# Patient Record
Sex: FEMALE | HISPANIC OR LATINO | Employment: FULL TIME
[De-identification: names, ages, dates, MRNs, and addresses within clinical notes are randomized per-mention and may not be internally consistent; named-entity substitution may affect disease eponyms.]

---

## 2017-12-31 ENCOUNTER — HEALTH MAINTENANCE LETTER (OUTPATIENT)
Age: 27
End: 2017-12-31

## 2019-02-21 ENCOUNTER — TELEPHONE (OUTPATIENT)
Dept: FAMILY MEDICINE | Facility: CLINIC | Age: 29
End: 2019-02-21

## 2020-03-10 ENCOUNTER — HEALTH MAINTENANCE LETTER (OUTPATIENT)
Age: 30
End: 2020-03-10

## 2020-04-06 ENCOUNTER — OFFICE VISIT - HEALTHEAST (OUTPATIENT)
Dept: FAMILY MEDICINE | Facility: CLINIC | Age: 30
End: 2020-04-06

## 2020-04-06 DIAGNOSIS — R05.9 COUGH: ICD-10-CM

## 2020-07-14 ENCOUNTER — OFFICE VISIT (OUTPATIENT)
Dept: OBGYN | Facility: CLINIC | Age: 30
End: 2020-07-14
Attending: STUDENT IN AN ORGANIZED HEALTH CARE EDUCATION/TRAINING PROGRAM
Payer: COMMERCIAL

## 2020-07-14 VITALS
BODY MASS INDEX: 23.56 KG/M2 | HEIGHT: 66 IN | HEART RATE: 75 BPM | SYSTOLIC BLOOD PRESSURE: 109 MMHG | DIASTOLIC BLOOD PRESSURE: 73 MMHG | WEIGHT: 146.6 LBS

## 2020-07-14 DIAGNOSIS — Z30.011 ENCOUNTER FOR INITIAL PRESCRIPTION OF CONTRACEPTIVE PILLS: ICD-10-CM

## 2020-07-14 DIAGNOSIS — Z12.4 CERVICAL CANCER SCREENING: Primary | ICD-10-CM

## 2020-07-14 DIAGNOSIS — Z11.3 ROUTINE SCREENING FOR STI (SEXUALLY TRANSMITTED INFECTION): ICD-10-CM

## 2020-07-14 PROCEDURE — 87624 HPV HI-RISK TYP POOLED RSLT: CPT | Performed by: STUDENT IN AN ORGANIZED HEALTH CARE EDUCATION/TRAINING PROGRAM

## 2020-07-14 PROCEDURE — 86780 TREPONEMA PALLIDUM: CPT | Performed by: STUDENT IN AN ORGANIZED HEALTH CARE EDUCATION/TRAINING PROGRAM

## 2020-07-14 PROCEDURE — G0145 SCR C/V CYTO,THINLAYER,RESCR: HCPCS | Performed by: STUDENT IN AN ORGANIZED HEALTH CARE EDUCATION/TRAINING PROGRAM

## 2020-07-14 PROCEDURE — 87340 HEPATITIS B SURFACE AG IA: CPT | Performed by: STUDENT IN AN ORGANIZED HEALTH CARE EDUCATION/TRAINING PROGRAM

## 2020-07-14 PROCEDURE — 87591 N.GONORRHOEAE DNA AMP PROB: CPT | Performed by: STUDENT IN AN ORGANIZED HEALTH CARE EDUCATION/TRAINING PROGRAM

## 2020-07-14 PROCEDURE — 86706 HEP B SURFACE ANTIBODY: CPT | Performed by: STUDENT IN AN ORGANIZED HEALTH CARE EDUCATION/TRAINING PROGRAM

## 2020-07-14 PROCEDURE — 87491 CHLMYD TRACH DNA AMP PROBE: CPT | Performed by: STUDENT IN AN ORGANIZED HEALTH CARE EDUCATION/TRAINING PROGRAM

## 2020-07-14 PROCEDURE — 86803 HEPATITIS C AB TEST: CPT | Performed by: STUDENT IN AN ORGANIZED HEALTH CARE EDUCATION/TRAINING PROGRAM

## 2020-07-14 PROCEDURE — 36415 COLL VENOUS BLD VENIPUNCTURE: CPT | Performed by: STUDENT IN AN ORGANIZED HEALTH CARE EDUCATION/TRAINING PROGRAM

## 2020-07-14 PROCEDURE — 87389 HIV-1 AG W/HIV-1&-2 AB AG IA: CPT | Performed by: STUDENT IN AN ORGANIZED HEALTH CARE EDUCATION/TRAINING PROGRAM

## 2020-07-14 RX ORDER — NORGESTIMATE AND ETHINYL ESTRADIOL 0.25-0.035
1 KIT ORAL DAILY
Qty: 28 TABLET | Refills: 11 | Status: SHIPPED | OUTPATIENT
Start: 2020-07-14 | End: 2021-07-08

## 2020-07-14 ASSESSMENT — MIFFLIN-ST. JEOR: SCORE: 1411.72

## 2020-07-14 NOTE — PATIENT INSTRUCTIONS
We will let you know your STI screening and pap smear results.  Use a back-up method of contraception for 7 days.

## 2020-07-14 NOTE — LETTER
2020       RE: Di Kennedy  [address] [address]     Dear Colleague,    Thank you for referring your patient, Di Kennedy, to the [facility] at [facility]. Please see a copy of my visit note below.    [facility]  Annual Exam    HPI:    Di Kennedy is a 30 year old , here for well woman exam. She is due for a Pap and would like STI screening. She is also interested in switching contraception methods. She has had the Kyleena for about 3 years, since soon after her baby is born. She is still breastfeeding and has heard there are contraception methods that will make her stop lactating. She also reports diffuse bone pain in her hips and sometimes in her arms that she attributes to her Kyleena. She has some pelvic cramps. She has just started to have minimal spotting but no true periods.     GYN History  - LMP: No LMP recorded. (Menstrual status: IUD).  - Menses: see HPI, spotting only on Kyleena  - Pap Smears: No history of abnormal pap, last Pap in 2017  - Sexual Activity: currently sexually active with one male partner, fairly recent new partner    OBHx  OB History    Para Term  AB Living   3 2 2 0 1 2   SAB TAB Ectopic Multiple Live Births   0 1 0 0 2      # Outcome Date GA Lbr Dell/2nd Weight Sex Delivery Anes PTL Lv   3 Term 17 37w1d 02:18 / 00:10 3.43 kg (7 lb 9 oz) F Vag-Spont None N SAILAJA      Name: WALT KENNEDY      Apgar1: 9  Apgar5: 9   2 Term 05/29/15 39w0d  4.082 kg (9 lb) F Vag-Spont EPI N SAILAJA      Name: Ngoc Pineda   1 TAB 14 5w0d             Birth Comments: Medical termination, no complications.        PMHx:   No significant PMHx     PSHx:   None    Meds:   Current Outpatient Medications:      levonorgestrel (KYLEENA) 19.5 MG IUD, 1 each by Intrauterine route, Disp: , Rfl:     Allergies:  NKDA     SocHx:   Social History     Socioeconomic History     Marital  "status: Single     Spouse name: Not on file     Number of children: Not on file     Years of education: Not on file     Highest education level: Not on file   Occupational History     Not on file   Social Needs     Financial resource strain: Not on file     Food insecurity     Worry: Not on file     Inability: Not on file     Transportation needs     Medical: Not on file     Non-medical: Not on file   Tobacco Use     Smoking status: Never Smoker     Smokeless tobacco: Never Used   Substance and Sexual Activity     Alcohol use: No     Drug use: No     Sexual activity: Yes     Partners: Male     Birth control/protection: Condom, I.U.D.   Lifestyle     Physical activity     Days per week: Not on file     Minutes per session: Not on file     Stress: Not on file   Relationships     Social connections     Talks on phone: Not on file     Gets together: Not on file     Attends Yazidism service: Not on file     Active member of club or organization: Not on file     Attends meetings of clubs or organizations: Not on file     Relationship status: Not on file     Intimate partner violence     Fear of current or ex partner: Not on file     Emotionally abused: Not on file     Physically abused: Not on file     Forced sexual activity: Not on file   Other Topics Concern     Not on file   Social History Narrative     Not on file     Lives with her daughters and a friend    ROS: 10-Point ROS negative except as noted in HPI    Preventative Health  Current or historical sexual, physical or mental abuse: denies  Feelings of depression: denies  Seat belt usage: Yes    Physical Exam  /73   Pulse 75   Ht 1.676 m (5' 6\")   Wt 66.5 kg (146 lb 9.6 oz)   BMI 23.66 kg/m    Gen: Well-appearing, NAD  HEENT: Normocephalic, atraumatic  Neck: Thyroid is not enlarged, no appreciable masses palpated. Non-tender  CV:  RRR, no m/r/g auscultated  Pulm: CTAB, no w/r/r auscultated  Abd: Soft, non-tender, non-distended  Ext: No LE edema, " extremities warm and well perfused    Breast: Symmetric, bilateral nipple piercings, no nipple discharge or retraction, no axillary lymphadenopathy, no palpable nodules or masses bilaterally    Pelvic:  Normal appearing external female genitalia. Normal hair distribution. Vagina is without lesions. discharge. Cervix multiparous, no lesions, no cervical motion tenderness. Uterus is small, mobile, non-tender. No adnexal tenderness or masses, urethral meatus wnl.      --Ideal BMI: 18.5-24.9  Current BMI: Body mass index is 23.66 kg/m .  --Underweight = <18.5  --Normal weight = 18.5-24.9  --Overweight = 25-29.9  --Obesity = >30    Assessment/Plan  Di Kennedy is a 30 year old  female here for annual exam.     1. Routine Health Maintenance:   - Offered breast exam.  No concerning findings on pelvic or breast exam.  - Immunizations will need influenza in fall    2. Cervical cancer screening: Pap collected today    3. STI screening  - GC/CT collected, heptatits, syphilis, HIV screening ordered    4. Contraception counseling  - pt counseled that Kyleena has not been shown to cause bone pain. Discussed that while estrogen-containing contraception may inhibit milk production with breastfeeding initiation, once supply is established, it is unlikely to change it. Discussed weaning, benadryl, george for decreasing lactation.   - pt still elected for contraception method switch. Had previous difficult implant removal. She feels like she will not have a hard time taking a pill every day and would like a pill. DINA rx given. Pt counseled on pros/cons of continuous vs cyclical use. Period tracker recommended.     Follow Up: one year. MyChart or phone call for results.     Padmini Caal MD  Ob/Gyn, PGY-4  2020 10:45 AM  I agree with note as above. The patient was seen in continuity clinic by the resident doctor.  Assessment and plan were jointly made.  Jada Soriano MD

## 2020-07-15 LAB
HBV SURFACE AB SERPL IA-ACNC: 58.74 M[IU]/ML
HBV SURFACE AG SERPL QL IA: NONREACTIVE
HCV AB SERPL QL IA: NONREACTIVE
HIV 1+2 AB+HIV1 P24 AG SERPL QL IA: NONREACTIVE
N GONORRHOEA DNA SPEC QL NAA+PROBE: NEGATIVE
SPECIMEN SOURCE: NORMAL
T PALLIDUM AB SER QL: NONREACTIVE

## 2020-07-16 ENCOUNTER — TELEPHONE (OUTPATIENT)
Dept: OBGYN | Facility: CLINIC | Age: 30
End: 2020-07-16

## 2020-07-16 DIAGNOSIS — Z11.8 SPECIAL SCREENING EXAMINATION FOR CHLAMYDIAL DISEASE: Primary | ICD-10-CM

## 2020-07-16 LAB
C TRACH DNA SPEC QL NAA+PROBE: POSITIVE
COPATH REPORT: NORMAL
PAP: NORMAL
SPECIMEN SOURCE: ABNORMAL

## 2020-07-16 RX ORDER — AZITHROMYCIN 500 MG/1
1000 TABLET, FILM COATED ORAL ONCE
Qty: 2 TABLET | Refills: 1 | Status: SHIPPED | OUTPATIENT
Start: 2020-07-16 | End: 2020-07-16

## 2020-07-16 NOTE — TELEPHONE ENCOUNTER
Telephone call from Lab with Positive chlamydia     LMP approx 2 weeks ago    NKDA    Discussed antibiotic azithromycin 1 gm sent to her pharmacy for a one time dose and 1 refill for her partner (does not think there is any allergies however will confirm before taking)    Discussed abstain from sexual activity x 7 days    BOTH must receive treatment or will not resolve infection - voices understanding    Test of cure not required  As pt is not pregnant    [place] department of health [place] mailed

## 2020-07-16 NOTE — PROGRESS NOTES
[facility]  Annual Exam    HPI:    Di Kennedy is a 30 year old , here for well woman exam. She is due for a Pap and would like STI screening. She is also interested in switching contraception methods. She has had the Kyleena for about 3 years, since soon after her baby is born. She is still breastfeeding and has heard there are contraception methods that will make her stop lactating. She also reports diffuse bone pain in her hips and sometimes in her arms that she attributes to her Kyleena. She has some pelvic cramps. She has just started to have minimal spotting but no true periods.     GYN History  - LMP: No LMP recorded. (Menstrual status: IUD).  - Menses: see HPI, spotting only on Kyleena  - Pap Smears: No history of abnormal pap, last Pap in 2017  - Sexual Activity: currently sexually active with one male partner, fairly recent new partner    OBHx  OB History    Para Term  AB Living   3 2 2 0 1 2   SAB TAB Ectopic Multiple Live Births   0 1 0 0 2      # Outcome Date GA Lbr Dell/2nd Weight Sex Delivery Anes PTL Lv   3 Term 17 37w1d 02:18 / 00:10 3.43 kg (7 lb 9 oz) F Vag-Spont None N SAILAJA      Name: WALT KENNEDY      Apgar1: 9  Apgar5: 9   2 Term 05/29/15 39w0d  4.082 kg (9 lb) F Vag-Spont EPI N SAILAJA      Name: Ngoc Pineda   1 TAB 14 5w0d             Birth Comments: Medical termination, no complications.        PMHx:   No significant PMHx     PSHx:   None    Meds:   Current Outpatient Medications:      levonorgestrel (KYLEENA) 19.5 MG IUD, 1 each by Intrauterine route, Disp: , Rfl:     Allergies:  NKDA     SocHx:   Social History     Socioeconomic History     Marital status: Single     Spouse name: Not on file     Number of children: Not on file     Years of education: Not on file     Highest education level: Not on file   Occupational History     Not on file   Social Needs     Financial resource strain: Not on file     Food insecurity     Worry: Not on  "file     Inability: Not on file     Transportation needs     Medical: Not on file     Non-medical: Not on file   Tobacco Use     Smoking status: Never Smoker     Smokeless tobacco: Never Used   Substance and Sexual Activity     Alcohol use: No     Drug use: No     Sexual activity: Yes     Partners: Male     Birth control/protection: Condom, I.U.D.   Lifestyle     Physical activity     Days per week: Not on file     Minutes per session: Not on file     Stress: Not on file   Relationships     Social connections     Talks on phone: Not on file     Gets together: Not on file     Attends Zoroastrian service: Not on file     Active member of club or organization: Not on file     Attends meetings of clubs or organizations: Not on file     Relationship status: Not on file     Intimate partner violence     Fear of current or ex partner: Not on file     Emotionally abused: Not on file     Physically abused: Not on file     Forced sexual activity: Not on file   Other Topics Concern     Not on file   Social History Narrative     Not on file     Lives with her daughters and a friend    ROS: 10-Point ROS negative except as noted in HPI    Preventative Health  Current or historical sexual, physical or mental abuse: denies  Feelings of depression: denies  Seat belt usage: Yes    Physical Exam  /73   Pulse 75   Ht 1.676 m (5' 6\")   Wt 66.5 kg (146 lb 9.6 oz)   BMI 23.66 kg/m    Gen: Well-appearing, NAD  HEENT: Normocephalic, atraumatic  Neck: Thyroid is not enlarged, no appreciable masses palpated. Non-tender  CV:  RRR, no m/r/g auscultated  Pulm: CTAB, no w/r/r auscultated  Abd: Soft, non-tender, non-distended  Ext: No LE edema, extremities warm and well perfused    Breast: Symmetric, bilateral nipple piercings, no nipple discharge or retraction, no axillary lymphadenopathy, no palpable nodules or masses bilaterally    Pelvic:  Normal appearing external female genitalia. Normal hair distribution. Vagina is without lesions. " discharge. Cervix multiparous, no lesions, no cervical motion tenderness. Uterus is small, mobile, non-tender. No adnexal tenderness or masses, urethral meatus wnl.      --Ideal BMI: 18.5-24.9  Current BMI: Body mass index is 23.66 kg/m .  --Underweight = <18.5  --Normal weight = 18.5-24.9  --Overweight = 25-29.9  --Obesity = >30    Assessment/Plan  Di Kennedy is a 30 year old  female here for annual exam.     1. Routine Health Maintenance:   - Offered breast exam.  No concerning findings on pelvic or breast exam.  - Immunizations will need influenza in fall    2. Cervical cancer screening: Pap collected today    3. STI screening  - GC/CT collected, heptatits, syphilis, HIV screening ordered    4. Contraception counseling  - pt counseled that Kyleena has not been shown to cause bone pain. Discussed that while estrogen-containing contraception may inhibit milk production with breastfeeding initiation, once supply is established, it is unlikely to change it. Discussed weaning, benadryl, george for decreasing lactation.   - pt still elected for contraception method switch. Had previous difficult implant removal. She feels like she will not have a hard time taking a pill every day and would like a pill. DINA rx given. Pt counseled on pros/cons of continuous vs cyclical use. Period tracker recommended.     Follow Up: one year. MyChart or phone call for results.     Padmini Caal MD  Ob/Gyn, PGY-4  2020 10:45 AM  I agree with note as above. The patient was seen in continuity clinic by the resident doctor.  Assessment and plan were jointly made.  Jada Soriano MD

## 2020-07-17 LAB
FINAL DIAGNOSIS: NORMAL
HPV HR 12 DNA CVX QL NAA+PROBE: NEGATIVE
HPV16 DNA SPEC QL NAA+PROBE: NEGATIVE
HPV18 DNA SPEC QL NAA+PROBE: NEGATIVE
SPECIMEN DESCRIPTION: NORMAL
SPECIMEN SOURCE CVX/VAG CYTO: NORMAL

## 2020-07-21 ENCOUNTER — TELEPHONE (OUTPATIENT)
Dept: OBGYN | Facility: CLINIC | Age: 30
End: 2020-07-21

## 2020-07-21 NOTE — TELEPHONE ENCOUNTER
----- Message from Danni Belle RN sent at 7/21/2020 10:49 AM CDT -----  Regarding: Question about being tested for herpes

## 2020-07-28 ENCOUNTER — OFFICE VISIT (OUTPATIENT)
Dept: FAMILY MEDICINE | Facility: CLINIC | Age: 30
End: 2020-07-28
Payer: COMMERCIAL

## 2020-07-28 VITALS
SYSTOLIC BLOOD PRESSURE: 104 MMHG | HEART RATE: 90 BPM | BODY MASS INDEX: 23.95 KG/M2 | WEIGHT: 149 LBS | DIASTOLIC BLOOD PRESSURE: 60 MMHG | HEIGHT: 66 IN | TEMPERATURE: 98.1 F

## 2020-07-28 DIAGNOSIS — N76.0 ACUTE VAGINITIS: Primary | ICD-10-CM

## 2020-07-28 LAB
BACTERIA: NORMAL
CLUE CELLS: NORMAL
MOTILE TRICHOMONAS: NEGATIVE
ODOR: NORMAL
WBC WET PREP: NORMAL (ref 2–5)
YEAST: PRESENT

## 2020-07-28 RX ORDER — FLUCONAZOLE 150 MG/1
TABLET ORAL
Qty: 2 TABLET | Refills: 1 | Status: SHIPPED | OUTPATIENT
Start: 2020-07-28 | End: 2020-10-01

## 2020-07-28 ASSESSMENT — MIFFLIN-ST. JEOR: SCORE: 1422.61

## 2020-07-28 NOTE — PROGRESS NOTES
"Di Kennedy is a 30 year old female here for the following issues:    Acute vaginitis   Di is a 31 yo woman, whose primary care provider is Dr. Felipa Obando. She comes in today for vaginitis symptoms. Was recently seen by her ob/gyn for annual exam. Had routine STD screening and had + chlamydia. Was treated with Azithromycin 1000mg on 7/16/20. She completed the dose without complication.    She reports over the past 3-4 days, thick white itchy vaginal discharge. Reports unprotected intercourse 2 days ago with a new partner. She had a Kyleena IUD removed 7/14/20 and had been given OCP prescription, but she has not started it yet.     She reports she took Plan B the evening after unprotected sex and had some cramping but no bleeding.     She bought OTC monistat product but has not used it . Reports she has never had a yeast infection and wished to come in to clinic for evaluation first.     Denies fever, pelvic cramping or genital lesions. She would like to be re screened for STD.  No LMP recorded (lmp unknown). Kyleena IUD just removed, she did not have menses with the IUD       Current Outpatient Medications   Medication Sig Dispense Refill     norgestimate-ethinyl estradiol (ORTHO-CYCLEN) 0.25-35 MG-MCG tablet Take 1 tablet by mouth daily (Patient not taking: Reported on 7/28/2020) 28 tablet 11       No Known Allergies     EXAM  /60 (BP Location: Right arm, Patient Position: Sitting, Cuff Size: Adult Regular)   Pulse 90   Temp 98.1  F (36.7  C) (Temporal)   Ht 1.676 m (5' 6\")   Wt 67.6 kg (149 lb)   LMP  (LMP Unknown)   BMI 24.05 kg/m    Gen: Alert, pleasant, NAD  : External genitalia without lesions, thick white curdy discharge in vaginal vault. Cervix visualized, normal in appearance.    Results for orders placed or performed in visit on 07/28/20   Wet Prep ([place])     Status: None   Result Value Ref [place]    Yeast Wet Prep Present none    Motile Trichomonas Wet Prep Negative Negative    Clue " Cells Wet Prep None NONE    WBC WET PREP 25-50 2 - 5    Bacteria Wet Prep Many None    Odor Wet Prep None NONE       Assessment     (N76.0) Acute vaginitis  (primary encounter diagnosis)  Comment: candida vaginitis, recent unprotected intercourse  Plan: Wet Prep ([place]), Neisseria gonorrhoeae         PCR, Chlamydia trachomatis PCR, fluconazole         (DIFLUCAN) 150 MG tablet        Treat for yeast infection with diflucan. Recommend starting OCP and using backup method x 1 month.   Screen for STD today.    Saritha Oropeza MD  Internal Medicine/Pediatrics

## 2020-07-28 NOTE — NURSING NOTE
"30 year old  Chief Complaint   Patient presents with     Vaginitis     thick creamy discharge, itchiness,   x 2-3 days        Blood pressure 104/60, pulse 90, temperature 98.1  F (36.7  C), temperature source Temporal, height 1.676 m (5' 6\"), weight 67.6 kg (149 lb). Body mass index is 24.05 kg/m .  There is no problem list on file for this patient.      Wt Readings from Last 2 Encounters:   07/28/20 67.6 kg (149 lb)   07/14/20 66.5 kg (146 lb 9.6 oz)     BP Readings from Last 3 Encounters:   07/28/20 104/60   07/14/20 109/73   08/07/15 101/66         Current Outpatient Medications   Medication     norgestimate-ethinyl estradiol (ORTHO-CYCLEN) 0.25-35 MG-MCG tablet     No current facility-administered medications for this visit.        Social History     Tobacco Use     Smoking status: Never Smoker     Smokeless tobacco: Never Used   Substance Use Topics     Alcohol use: No     Drug use: No       Health Maintenance Due   Topic Date Due     PHQ-2  01/01/2020       Lab Results   Component Value Date    PAP NIL 07/14/2020 July 28, 2020 8:52 AM  "

## 2020-07-29 LAB
C TRACH DNA SPEC QL NAA+PROBE: NEGATIVE
N GONORRHOEA DNA SPEC QL NAA+PROBE: NEGATIVE
SPECIMEN SOURCE: NORMAL
SPECIMEN SOURCE: NORMAL

## 2020-10-01 ENCOUNTER — MYC REFILL (OUTPATIENT)
Dept: FAMILY MEDICINE | Facility: CLINIC | Age: 30
End: 2020-10-01

## 2020-10-01 DIAGNOSIS — N76.0 ACUTE VAGINITIS: ICD-10-CM

## 2020-10-02 RX ORDER — FLUCONAZOLE 150 MG/1
TABLET ORAL
Qty: 2 TABLET | Refills: 1 | Status: SHIPPED | OUTPATIENT
Start: 2020-10-02 | End: 2021-12-23

## 2020-10-02 NOTE — TELEPHONE ENCOUNTER
[facility] Call Center    Phone Message    May a detailed message be left on voicemail: yes     Reason for Call: Other: Call Back:  Patient states she does not have a PCP. She was last seen by Dr Oropeza on 7/28/2020 for a yeast infection. Please contact patient on [phone] if calling before 1 pm or call [phone], if calling between 1 pm and 4 pm today. Please advise.     Action Taken: Message routed to:  [place] [facility]: [facility]    Travel Screening: Not Applicable

## 2020-10-02 NOTE — TELEPHONE ENCOUNTER
"Called pt  to discuss this med request., ? Also if we are PCP, or not?   LVM to call back     Alondra Escoto RN  October 2, 2020 11:58 AM    Pt called back.  She does not have any concerns about STI's , is not currently on antibiotics either.  She notes early onset of itchy vaginal area and white, thicker vag discharge.  \"It reminds me of last yeast infection\"    Pingel notified and she is fine with the refill.  She will also take pt on as her primary, and this is what pt is asking.  She has not gone back to Dr. Obando, and does not want to.  Pt will make future appt to \"est care\" with her.     Pt agrees to plan and refill sent in.    Alondra Escoto RN  October 2, 2020 3:19 PM                  "

## 2020-12-05 ENCOUNTER — E-VISIT (OUTPATIENT)
Dept: URGENT CARE | Facility: CLINIC | Age: 30
End: 2020-12-05
Payer: COMMERCIAL

## 2020-12-05 DIAGNOSIS — Z20.822 EXPOSURE TO COVID-19 VIRUS: Primary | ICD-10-CM

## 2020-12-05 DIAGNOSIS — Z20.822 EXPOSURE TO COVID-19 VIRUS: ICD-10-CM

## 2020-12-05 DIAGNOSIS — Z20.822 SUSPECTED COVID-19 VIRUS INFECTION: ICD-10-CM

## 2020-12-05 LAB
SARS-COV-2 RNA SPEC QL NAA+PROBE: NORMAL
SPECIMEN SOURCE: NORMAL

## 2020-12-05 PROCEDURE — U0003 INFECTIOUS AGENT DETECTION BY NUCLEIC ACID (DNA OR RNA); SEVERE ACUTE RESPIRATORY SYNDROME CORONAVIRUS 2 (SARS-COV-2) (CORONAVIRUS DISEASE [COVID-19]), AMPLIFIED PROBE TECHNIQUE, MAKING USE OF HIGH THROUGHPUT TECHNOLOGIES AS DESCRIBED BY CMS-2020-01-R: HCPCS | Performed by: INTERNAL MEDICINE

## 2020-12-05 PROCEDURE — 99421 OL DIG E/M SVC 5-10 MIN: CPT | Performed by: INTERNAL MEDICINE

## 2020-12-05 NOTE — PATIENT INSTRUCTIONS
Dear Di Kennedy,    Your symptoms show that you may have coronavirus (COVID-19). This illness can cause fever, cough and trouble breathing. Many people get a mild case and get better on their own. Some people can get very sick.    Will I be tested for COVID-19?  We would like to test you for Covid-19 virus. I have placed orders for this test.     For all employees or close contacts (except [place] and [employer] - see below), go to your [employer] home page and scroll down to the section that says  You have an appointment that needs to be scheduled  and click the large green button that says  Schedule Now  and follow the steps to find the next available opening.     If you are unable to complete these steps or if you cannot find any available times, please call [phone] to schedule employee testing.       [place] employees or close contacts, please call [phone].   [place] ([employer]) employees or close contacts call [phone].    When it's time for your COVID test:  Stay at least 6 feet away from others. (If someone will drive you to your test, stay in the backseat, as far away from the  as you can.)  Cover your mouth and nose with a mask, tissue or washcloth.  Go straight to the testing site. Don't make any stops on the way there or back.    Starting now:     Do not go to work.   o If you receive a negative COVID-19 test result and were NOT exposed to someone with a known positive COVID-19 test, you can return to work once you're free of fever for 24 hours without fever-reducing medication and your symptoms are improving or resolved.  o If you receive a positive COVID-19 test result, you must be cleared by Employee Occupational Health and Safety to return to work.   o If you were exposed to someone who has tested positive for COVID-19, you can return to work 14 days after your last contact with the positive individual, provided you do not have symptoms at all during that time. In some cases,  "your manager may ask you to come back sooner than 14 days.     During this time, don't leave the house except for testing or medical care.  o Stay in your own room, even for meals. Use your own bathroom if you can.  o Stay away from others in your home. No hugging, kissing or shaking hands. No visitors.  o Don't go to work, school or anywhere else.    Clean \"high touch\" surfaces often (doorknobs, counters, handles, etc.). Use a household cleaning spray or wipes. You'll find a full list of  on the EPA website: www.epa.gov/pesticide-registration/list-n-disinfectants-use-against-sars-cov-2.    Cover your mouth and nose with a mask, tissue or washcloth to avoid spreading germs.    Wash your hands and face often. Use soap and water.    People in these groups are at risk for severe illness due to COVID-19:  o People 65 years and older  o People who live in a nursing home or long-term care facility  o People with chronic disease (lung, heart, cancer, diabetes, kidney, liver, immunologic)  o People who have a weakened immune system, including those who:  - Are in cancer treatment  - Take medicine that weakens the immune system, such as corticosteroids  - Had a bone marrow or organ transplant  - Have an immune deficiency  - Have poorly controlled HIV or AIDS  - Are obese (body mass index of 40 or higher)  - Smoke regularly      Caregivers should wear gloves while washing dishes, handling laundry and cleaning bedrooms and bathrooms.    Use caution when washing and drying laundry: Don't shake dirty laundry, and use the warmest water setting that you can.    For more tips, go to www.cdc.gov/coronavirus/2019-ncov/downloads/10Things.pdf.    Sign up for [employer]. We know it's scary to hear that you might have COVID-19. We want to track your symptoms to make sure you're okay over the next 2 weeks. Please look for an email from waygum--this is a free, online program that we'll use to keep in touch. To sign up, " follow the link in the email you will receive. Learn more at [email or web address]    How can I take care of myself?    Get lots of rest. Drink extra fluids (unless a doctor has told you not to)    Take Tylenol (acetaminophen) for fever or pain. If you have liver or kidney problems, ask your family doctor if it's okay to take Tylenol.  Adults can take either:    650 mg (two 325 mg pills) every 4 to 6 hours, or     1,000 mg (two 500 mg pills) every 8 hours as needed.    Note: Don't take more than 3,000 mg in one day. Acetaminophen is found in many medicines (both prescribed and over-the-counter medicines). Read all labels to be sure you don't take too much.  For children, check the Tylenol bottle for the right dose. The dose is based on the child's age or weight.    If you have other health problems (like cancer, heart failure, an organ transplant or severe kidney disease): Call your specialty clinic if you don't feel better in the next 2 days.    Know when to call 911. Emergency warning signs include:  Trouble breathing or shortness of breath  Pain or pressure in the chest that doesn't go away  Feeling confused like you haven't felt before, or not being able to wake up  Bluish-colored lips or face    Where can I get more information?    [facility] - About COVID-19: [email or web address]  CDC - What to Do If You're Sick: www.cdc.gov/coronavirus/2019-ncov/about/steps-when-sick.html  December 5, 2020    RE:  Di Kennedy                                                                                                                                                       [address] [place] [address]        To whom it may concern:    I evaluated Di Kennedy on 12/05/20. Di Kennedy should be excused from work/school.     Do not go to work.      If you receive a negative COVID-19 test result and were NOT exposed to someone with a known positive COVID-19 test, you can return to  work once you're free of fever for 24 hours without fever-reducing medication and your symptoms are improving or resolved.    If you receive a positive COVID-19 test result, you must be cleared by Employee Occupational Health and Safety to return to work.     If you were exposed to someone who has tested positive for COVID-19, you can return to work 14 days after your last contact with the positive individual, provided you do not have symptoms at all during that time. In some cases, your manager may ask you to come back sooner than 14 days.       Sincerely,  Kim Nguyễn MD

## 2020-12-06 ENCOUNTER — TELEPHONE (OUTPATIENT)
Dept: EMERGENCY MEDICINE | Facility: CLINIC | Age: 30
End: 2020-12-06

## 2020-12-06 LAB
LABORATORY COMMENT REPORT: ABNORMAL
SARS-COV-2 RNA SPEC QL NAA+PROBE: POSITIVE
SPECIMEN SOURCE: ABNORMAL

## 2020-12-06 NOTE — TELEPHONE ENCOUNTER
"Coronavirus (COVID-19) Notification    Caller Name (Patient, parent, daughter/son, grandparent, etc)  Patient     Reason for call  Notify of Positive Coronavirus (COVID-19) lab results, assess symptoms,  review [facility] recommendations    Lab Result    Lab test:  2019-nCoV rRt-PCR or SARS-CoV-2 PCR    Oropharyngeal AND/OR nasopharyngeal swabs is POSITIVE for 2019-nCoV RNA/SARS-COV-2 PCR (COVID-19 virus)    RN Recommendations/Instructions per [facility] Coronavirus COVID-19 recommendations    Brief introduction script  Introduce self then review script:  \"I am calling on behalf of [employer].  We were notified that your Coronavirus test (COVID-19) for was POSITIVE for the virus.  I have some information to relay to you but first I wanted to mention that the [place] Dept of Health will be contacting you shortly [it's possible MD already called Patient] to talk to you more about how you are feeling and other people you have had contact with who might now also have the virus.  Also, [facility] is Partnering with the [facility] for Covid-19 research, you may be contacted directly by research staff.\"    Assessment (Inquire about Patient's current symptoms)   Assessment   Current Symptoms at time of phone call: (if no symptoms, document No symptoms]  headache   Symptoms onset (if applicable)  12/2/20     If at time of call, Patients symptoms hare worsened, the Patient should contact 911 or have someone drive them to Emergency Dept promptly:      If Patient calling 911, inform 911 personal that you have tested positive for the Coronavirus (COVID-19).  Place mask on and await 911 to arrive.    If Emergency Dept, If possible, please have another adult drive you to the Emergency Dept but you need to wear mask when in contact with other people.      Review information with Patient    Your result was positive. This means you have COVID-19 (coronavirus).  We have sent you a letter that reviews the " information that I'll be reviewing with you now.    How can I protect others?    If you have symptoms: stay home and away from others (self-isolate) until:    You've had no fever--and no medicine that reduces fever--for 1 full day (24 hours). And       Your other symptoms have gotten better. For example, your cough or breathing has improved. And     At least 10 days have passed since your symptoms started. (If you've been told by a doctor that you have a weak immune system, wait 20 days.)     If you don't have symptoms: Stay home and away from others (self-isolate) until at least 10 days have passed since your first positive COVID-19 test. (Date test collected)    During this time:    Stay in your own room, including for meals. Use your own bathroom if you can.    Stay away from others in your home. No hugging, kissing or shaking hands. No visitors.     Don't go to work, school or anywhere else.     Clean  high touch  surfaces often (doorknobs, counters, handles, etc.). Use a household cleaning spray or wipes. You'll find a full list on the EPA website at www.epa.gov/pesticide-registration/list-n-disinfectants-use-against-sars-cov-2.     Cover your mouth and nose with a mask, tissue or other face covering to avoid spreading germs.    Wash your hands and face often with soap and water.    Caregivers in these groups are at risk for severe illness due to COVID-19:  o People 65 years and older  o People who live in a nursing home or long-term care facility  o People with chronic disease (lung, heart, cancer, diabetes, kidney, liver, immunologic)  o People who have a weakened immune system, including those who:  - Are in cancer treatment  - Take medicine that weakens the immune system, such as corticosteroids  - Had a bone marrow or organ transplant  - Have an immune deficiency  - Have poorly controlled HIV or AIDS  - Are obese (body mass index of 40 or higher)  - Smoke regularly    Caregivers should wear gloves while  washing dishes, handling laundry and cleaning bedrooms and bathrooms.    Wash and dry laundry with special caution. Don't shake dirty laundry, and use the warmest water setting you can.    If you have a weakened immune system, ask your doctor about other actions you should take.    For more tips, go to www.cdc.gov/coronavirus/2019-ncov/downloads/10Things.pdf.    You should not go back to work until you meet the guidelines above for ending your home isolation. You don't need to be retested for COVID-19 before going back to work--studies show that you won't spread the virus if it's been at least 10 days since your symptoms started (or 20 days, if you have a weak immune system).    Employers: This document serves as formal notice of your employee's medical guidelines for going back to work. They must meet the above guidelines before going back to work in person.    How can I take care of myself?    1. Get lots of rest. Drink extra fluids (unless a doctor has told you not to).    2. Take Tylenol (acetaminophen) for fever or pain. If you have liver or kidney problems, ask your family doctor if it's okay to take Tylenol.     Take either:     650 mg (two 325 mg pills) every 4 to 6 hours, or     1,000 mg (two 500 mg pills) every 8 hours as needed.     Note: Don't take more than 3,000 mg in one day. Acetaminophen is found in many medicines (both prescribed and over-the-counter medicines). Read all labels to be sure you don't take too much.    For children, check the Tylenol bottle for the right dose (based on their age or weight).    3. If you have other health problems (like cancer, heart failure, an organ transplant or severe kidney disease): Call your specialty clinic if you don't feel better in the next 2 days.    4. Know when to call 911: Emergency warning signs include:    Trouble breathing or shortness of breath    Pain or pressure in the chest that doesn't go away    Feeling confused like you haven't felt before, or  not being able to wake up    Bluish-colored lips or face    5. Sign up for [employer]. We know it's scary to hear that you have COVID-19. We want to track your symptoms to make sure you're okay over the next 2 weeks. Please look for an email from [employer]--this is a free, online program that we'll use to keep in touch. To sign up, follow the link in the email. Learn more at [email or web address].    Where can I get more information?    [facility]: [email or web address]    Coronavirus Basics: www.health.[facility].[place]./diseases/coronavirus/basics.html    What to Do If You're Sick: www.cdc.gov/coronavirus/2019-ncov/about/steps-when-sick.html    Ending Home Isolation: www.cdc.gov/coronavirus/2019-ncov/hcp/disposition-in-home-patients.html     Caring for Someone with COVID-19: www.cdc.gov/coronavirus/2019-ncov/if-you-are-sick/care-for-someone.html     [facility] clinical trials (COVID-19 research studies): clinicalaffairs.[facility]-clinical-trials     A Positive COVID-19 letter will be sent via [employer] or the mail. (Exception, no letters sent to Presurgerical/Preprocedure Patients)    Jude Alvares RN

## 2020-12-27 ENCOUNTER — HEALTH MAINTENANCE LETTER (OUTPATIENT)
Age: 30
End: 2020-12-27

## 2021-03-02 ENCOUNTER — OFFICE VISIT (OUTPATIENT)
Dept: FAMILY MEDICINE | Facility: CLINIC | Age: 31
End: 2021-03-02
Payer: COMMERCIAL

## 2021-03-02 DIAGNOSIS — N93.9 VAGINAL SPOTTING: Primary | ICD-10-CM

## 2021-03-02 DIAGNOSIS — M54.50 NONSPECIFIC LOW BACK PAIN: ICD-10-CM

## 2021-03-02 DIAGNOSIS — M25.551 HIP PAIN, BILATERAL: ICD-10-CM

## 2021-03-02 DIAGNOSIS — M25.552 HIP PAIN, BILATERAL: ICD-10-CM

## 2021-03-02 ASSESSMENT — PAIN SCALES - GENERAL: PAINLEVEL: NO PAIN (1)

## 2021-03-02 ASSESSMENT — MIFFLIN-ST. JEOR: SCORE: 1444.83

## 2021-03-02 NOTE — NURSING NOTE
"31 year old  Chief Complaint   Patient presents with     Medication Problem     patient has been spotting on and off for two weeks after taking a new generic birth control medication. Then about a week, she has noticed a large blood clot and she is very concern.        Blood pressure 114/73, pulse 71, temperature 97.1  F (36.2  C), temperature source Temporal, resp. rate 12, height 1.676 m (5' 6\"), weight 70.3 kg (155 lb), last menstrual period 02/15/2021, SpO2 97 %. Body mass index is 25.02 kg/m .  There is no problem list on file for this patient.      Wt Readings from Last 2 Encounters:   03/02/21 70.3 kg (155 lb)   07/28/20 67.6 kg (149 lb)     BP Readings from Last 3 Encounters:   03/02/21 114/73   07/28/20 104/60   07/14/20 109/73         Current Outpatient Medications   Medication     fluconazole (DIFLUCAN) 150 MG tablet     norgestimate-ethinyl estradiol (ORTHO-CYCLEN) 0.25-35 MG-MCG tablet     No current facility-administered medications for this visit.        Social History     Tobacco Use     Smoking status: Never Smoker     Smokeless tobacco: Never Used   Substance Use Topics     Alcohol use: No     Drug use: No       Health Maintenance Due   Topic Date Due     ADVANCE CARE PLANNING  1990     INFLUENZA VACCINE (1) 09/01/2020       Lab Results   Component Value Date    PAP NIL 07/14/2020         Destiny Cobb CMA, FELIZ  March 2, 2021 10:23 AM  "

## 2021-03-02 NOTE — PROGRESS NOTES
"Di Kennedy is a 31 year old female here for the following issues:    Vaginal spotting   Di is a 30 yo woman who follows with ob/gyn, Dr. Padmini Caal. She was started on Ortho Cyclen last summer.  She notes new onset of spotting which began 2 wks ago. She typically has a monthly cycle but reports she started taking her pill packs back to back. Spotting is light pink but she also passed a thumb sized clot a few days after she was late on taking a pill.  No cramping.     OB/GYN HISTORY:  LMP: Patient's last menstrual period was Jan 18, 2021   Normal, light, cramps, 2-3 days of flow, 3x per day. No clots  G 3   P 3   A 0      Bilateral Hip and lower back pain  Di reports bilateral hip pain since the birth of her last child 3 yr ago. Right hip, > Left hip. Pain is fairly consistent, can worsen after a day of activity. Painful to lay on that side. Not taking anything for pain. No radiation of pain down her leg.       Patient Active Problem List   Diagnosis     Hip pain, bilateral     Nonspecific low back pain       Current Outpatient Medications   Medication Sig Dispense Refill     fluconazole (DIFLUCAN) 150 MG tablet Take one at onset of symptoms, then repeat in 5 days if needed 2 tablet 1     norgestimate-ethinyl estradiol (ORTHO-CYCLEN) 0.25-35 MG-MCG tablet Take 1 tablet by mouth daily (Patient not taking: Reported on 7/28/2020) 28 tablet 11       No Known Allergies     EXAM  /73 (BP Location: Left arm, Patient Position: Chair, Cuff Size: Adult Regular)   Pulse 71   Temp 97.1  F (36.2  C) (Temporal)   Resp 12   Ht 1.676 m (5' 6\")   Wt 70.3 kg (155 lb)   LMP 01/18/2021 (Exact Date)   SpO2 97%   Breastfeeding No   BMI 25.02 kg/m    Gen: Alert, pleasant, NAD  COR: S1,S2, no murmur  Lungs: CTA bilaterally, no rhonchi, wheezes or rales  Abd: soft, nontender, normal BS  MS: No tenderness over the bony C, T, or LS spinous bodies  Point tenderness over the SI joints, R>L  Point tenderness with " palpation over the lateral hips (trochanteric bursa)      Assessment:  (N93.9) Vaginal spotting  (primary encounter diagnosis)  Comment: persistent spotting while taking continuous ocp. Recent passage of clot  Plan: discussed spotting may occur if she misses a pill. Helpful to be consistent with dosing. She will finish her pill pack this week, than allow a period. If symptoms persist after she starts new pill pack then would refer for pelvic ultrasound.     (M25.551,  M25.552) Hip pain, bilateral  Comment: suspect trochanteric bursitis.   Plan: gave exercises on exercises, stretches, NSAIDs  If not improving then refer to sports medicine clinic.    (M54.5) Nonspecific low back pain  Comment: pain over SI joints, R>L, gluteal muscles  Plan: gave exercises on SI joint pain . Ok to take NSAIDs, use heat, do stretches.    35 minutes spend on the date of this encounter doing chart review, history and exam, documentation and further activities as noted above.     Saritha Oropeza MD  Internal Medicine/Pediatrics

## 2021-03-03 NOTE — PATIENT INSTRUCTIONS
Managing missed pills  I missed a pill. What should I do?  If you missed 1 pill, take it as soon as you remember to. If you are taking today s pill and realize you missed yesterday s pill, take both pills at the same time. You are still protected from pregnancy as long as you catch up your doses within 24 hours. In this case, you do not need emergency contraception. You may see break through bleeding. If taking a progestin only pill, you should use a back up method for the next 48 hours.    If you missed 2 pills in a row -- including starting your pack 2 days late -- you have lost protection against pregnancy. Use emergency contraception if you have had unprotected intercourse. Catch up on your missed pills by taking 2 today and 2 tomorrow. Continue taking the rest of the pack. Make sure to use back-up contraception, such as condoms, for 7 days. (It takes 7 days back on schedule to get your contraception back.) Expect break-through bleeding.    If you missed 3 pills in a row, you are going to bleed -- just consider this your period placebo days. Start a new pack of pills right away. Make sure to use back-up contraception, such as condoms, for 7 days. Do not rely on the pill to protect you from pregnancy until you are back on it for 7 days! Use emergency contraception if:    You have unprotected sex during those 7 days.  You have unprotected sex after missing your pills for 48 to 72 hours and you have not been taking your pills correctly and on schedule for 7 days.

## 2021-03-07 VITALS
BODY MASS INDEX: 24.91 KG/M2 | WEIGHT: 155 LBS | RESPIRATION RATE: 12 BRPM | OXYGEN SATURATION: 97 % | DIASTOLIC BLOOD PRESSURE: 73 MMHG | SYSTOLIC BLOOD PRESSURE: 114 MMHG | TEMPERATURE: 97.1 F | HEART RATE: 71 BPM | HEIGHT: 66 IN

## 2021-03-07 PROBLEM — M25.551 HIP PAIN, BILATERAL: Status: ACTIVE | Noted: 2021-03-07

## 2021-03-07 PROBLEM — M25.552 HIP PAIN, BILATERAL: Status: ACTIVE | Noted: 2021-03-07

## 2021-03-07 PROBLEM — M54.50 NONSPECIFIC LOW BACK PAIN: Status: ACTIVE | Noted: 2021-03-07

## 2021-03-22 ENCOUNTER — OFFICE VISIT (OUTPATIENT)
Dept: URGENT CARE | Facility: URGENT CARE | Age: 31
End: 2021-03-22
Payer: COMMERCIAL

## 2021-03-22 VITALS
HEIGHT: 65 IN | SYSTOLIC BLOOD PRESSURE: 123 MMHG | DIASTOLIC BLOOD PRESSURE: 81 MMHG | OXYGEN SATURATION: 98 % | WEIGHT: 150 LBS | TEMPERATURE: 98.1 F | HEART RATE: 81 BPM | BODY MASS INDEX: 24.99 KG/M2

## 2021-03-22 DIAGNOSIS — N61.0 MASTITIS: Primary | ICD-10-CM

## 2021-03-22 PROCEDURE — 99203 OFFICE O/P NEW LOW 30 MIN: CPT | Performed by: NURSE PRACTITIONER

## 2021-03-22 RX ORDER — DICLOXACILLIN SODIUM 500 MG
500 CAPSULE ORAL 3 TIMES DAILY
Qty: 30 CAPSULE | Refills: 0 | Status: SHIPPED | OUTPATIENT
Start: 2021-03-22 | End: 2021-04-01

## 2021-03-22 ASSESSMENT — MIFFLIN-ST. JEOR: SCORE: 1406.28

## 2021-03-22 NOTE — PROGRESS NOTES
"Chief Complaint   Patient presents with     Urgent Care     Pt in clinic to have eval for left breast pain.     Breast Pain         ICD-10-CM    1. Mastitis  N61.0 dicloxacillin (DYNAPEN) 500 MG capsule   Will treat as mastitis for now but if lump does not resolve with treatment she will need further imaging such as ultrasound or mammogram to determine if there may be another cause.  She denies feeling any such lumps previously in her breasts so this is concerning for new mass.    Medical Decision Making    Differential Diagnosis includes but is not limited to mastitis, breast mass, breast cancer, plugged lactation duct, cellulitis    Subjective     Di Kennedy is an 31 year old female who presents to clinic today for left breast tenderness. Started 3 days ago. 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  Started slowly with tenderness in the left breast and has been worsening over the last several days.  She has been warm packing the breast regularly but has not found relief.  She does think breast is swollen.  She has never had a mammogram and there is no family history of breast cancer that she is aware of.  She does have some drainage from the nipple but this is bilateral and she has had it ever since having her nipples pierced.  She denies any fever, chills, fatigue, injuries or accidents recently.     Objective    /81   Pulse 81   Temp 98.1  F (36.7  C) (Oral)   Ht 1.651 m (5' 5\")   Wt 68 kg (150 lb)   LMP 03/15/2021   SpO2 98%   BMI 24.96 kg/m      Physical Exam       GENERAL APPEARANCE: healthy appearing, alert     NECK: no adenopathy or asymmetry, thyroid normal to palpation     RESP: lungs clear to auscultation - no rales, rhonchi or wheezes     CV: regular rates and rhythm, no murmurs, rubs, or gallop     MS: extremities normal- no gross deformities noted; normal muscle tone.     SKIN: Erythema over the left breast  BREASTS: Right breast appears normal, nontender, no swelling , left " breast has a 1.5 cm irregularly shaped lump at the 12 o'clock position that is tender to the touch, and there is splotchy erythema over the entire breast    Patient Instructions     Patient Education     Mastitis  Mastitis occurs when breast tissue becomes swollen and inflamed. This is almost always due to infection. Mastitis most often affects breastfeeding women during the first 6 weeks after childbirth. For this reason, it s also known as lactation mastitis. Infection may happen after a duct becomes clogged, causing milk to back up in the breast. Mastitis may also occur if bacteria enter the breast through small cracks in the nipple. (Less often, mastitis occurs in women who aren t breastfeeding. If you have mastitis that is not due to breastfeeding, your healthcare provider will give you more information as needed. Treatment may include some of the same home care measures listed below.)  Mastitis may cause flu-like symptoms such as fever, aches, and fatigue. The affected breast may feel painful, warm, tender, firm, or swollen. The skin over the breast may be red (often in a wedge-shaped pattern). You may feel a burning a sensation when breastfeeding.  In most cases, mastitis can be treated with antibiotics. This should clear the infection. If treatment is delayed, a pocket of pus (abscess) can form in the breast tissue. A procedure may then be needed to drain the pus. In severe cases of infection, other treatments may be needed.  Home care  Breastfeeding    It s very important to keep the milk flowing from the infected breast. Continue breastfeeding from both breasts as usual. This will not hurt the baby. If this is too painful, use a breast pump to remove milk from the infected side. This can be fed to your baby or discarded. Note: If you don't continue to breastfeed or pump your breast, bacteria can grow in the milk that is left in your breast. This can make your infection worse.    Tell your healthcare  provider if you have problems with breastfeeding. He or she may suggest changes to your technique, if needed. You may also be referred to a lactation nurse or consultant for support with breastfeeding.  General care    Take any medicines you re prescribed as directed. If you re taking antibiotics, be sure to complete all of the medicine even if you start to feel better. Over-the-counter pain medicines may also be recommended. Don t use breast creams or other products or medicines without talking to your healthcare provider first. Note: If you re concerned about taking medicines while breastfeeding, talk to your healthcare provider.    Rest as often as needed. Also be sure to drink plenty of fluids.    To help relieve pain and swelling, heat or ice may be used. Apply as often as directed by your provider.  ? Heat: Place a warm compress on the breast. Use a towel soaked in hot water, a heating pad, or a hot water bottle.  ? Cold: Place a cold compress on the breast. Use an ice pack or bag of ice wrapped in a thin towel. Never place a cold source directly on the skin.    Follow-up care  Follow up with your healthcare provider as advised.  When to seek medical advice  Call your healthcare provider right away if any of these occur:    Fever of 100.4 F (38 C) or higher, or as directed by your provider    Shaking chills    Worsening symptoms or symptoms that don't improve within 48 to 72 hours of starting treatment    New symptoms develop  [employer] last reviewed this educational content on 6/1/2018 2000-2020 [employer]. All rights reserved. This information is not intended as a substitute for professional medical care. Always follow your healthcare professional's instructions.               ANNA Valladares, CNP  [facility] Provider

## 2021-03-22 NOTE — PATIENT INSTRUCTIONS
Patient Education     Mastitis  Mastitis occurs when breast tissue becomes swollen and inflamed. This is almost always due to infection. Mastitis most often affects breastfeeding women during the first 6 weeks after childbirth. For this reason, it s also known as lactation mastitis. Infection may happen after a duct becomes clogged, causing milk to back up in the breast. Mastitis may also occur if bacteria enter the breast through small cracks in the nipple. (Less often, mastitis occurs in women who aren t breastfeeding. If you have mastitis that is not due to breastfeeding, your healthcare provider will give you more information as needed. Treatment may include some of the same home care measures listed below.)  Mastitis may cause flu-like symptoms such as fever, aches, and fatigue. The affected breast may feel painful, warm, tender, firm, or swollen. The skin over the breast may be red (often in a wedge-shaped pattern). You may feel a burning a sensation when breastfeeding.  In most cases, mastitis can be treated with antibiotics. This should clear the infection. If treatment is delayed, a pocket of pus (abscess) can form in the breast tissue. A procedure may then be needed to drain the pus. In severe cases of infection, other treatments may be needed.  Home care  Breastfeeding    It s very important to keep the milk flowing from the infected breast. Continue breastfeeding from both breasts as usual. This will not hurt the baby. If this is too painful, use a breast pump to remove milk from the infected side. This can be fed to your baby or discarded. Note: If you don't continue to breastfeed or pump your breast, bacteria can grow in the milk that is left in your breast. This can make your infection worse.    Tell your healthcare provider if you have problems with breastfeeding. He or she may suggest changes to your technique, if needed. You may also be referred to a lactation nurse or consultant for support with  breastfeeding.  General care    Take any medicines you re prescribed as directed. If you re taking antibiotics, be sure to complete all of the medicine even if you start to feel better. Over-the-counter pain medicines may also be recommended. Don t use breast creams or other products or medicines without talking to your healthcare provider first. Note: If you re concerned about taking medicines while breastfeeding, talk to your healthcare provider.    Rest as often as needed. Also be sure to drink plenty of fluids.    To help relieve pain and swelling, heat or ice may be used. Apply as often as directed by your provider.  ? Heat: Place a warm compress on the breast. Use a towel soaked in hot water, a heating pad, or a hot water bottle.  ? Cold: Place a cold compress on the breast. Use an ice pack or bag of ice wrapped in a thin towel. Never place a cold source directly on the skin.    Follow-up care  Follow up with your healthcare provider as advised.  When to seek medical advice  Call your healthcare provider right away if any of these occur:    Fever of 100.4 F (38 C) or higher, or as directed by your provider    Shaking chills    Worsening symptoms or symptoms that don't improve within 48 to 72 hours of starting treatment    New symptoms develop  [employer] last reviewed this educational content on 6/1/2018 2000-2020 [employer]. All rights reserved. This information is not intended as a substitute for professional medical care. Always follow your healthcare professional's instructions.

## 2021-04-28 ENCOUNTER — OFFICE VISIT (OUTPATIENT)
Dept: OBGYN | Facility: CLINIC | Age: 31
End: 2021-04-28
Payer: COMMERCIAL

## 2021-04-28 VITALS
WEIGHT: 152.3 LBS | HEART RATE: 69 BPM | SYSTOLIC BLOOD PRESSURE: 116 MMHG | BODY MASS INDEX: 25.37 KG/M2 | DIASTOLIC BLOOD PRESSURE: 74 MMHG | HEIGHT: 65 IN

## 2021-04-28 DIAGNOSIS — N64.4 BREAST PAIN: Primary | ICD-10-CM

## 2021-04-28 PROCEDURE — G0463 HOSPITAL OUTPT CLINIC VISIT: HCPCS

## 2021-04-28 PROCEDURE — 99212 OFFICE O/P EST SF 10 MIN: CPT | Mod: GE | Performed by: OBSTETRICS & GYNECOLOGY

## 2021-04-28 ASSESSMENT — MIFFLIN-ST. JEOR: SCORE: 1416.71

## 2021-04-28 NOTE — PROGRESS NOTES
"[facility]  Follow-Up Visit    S: Di Kennedy is a 31 year old  here for for a breast lump and pain. She reports a breast lump under her left nipple that lasted about a month. She visited in urgent care in late March and was treated for presumptive mastitis. Last  8 months ago. No fevers/chills. Her symptoms did not improve, so she made this appointment. The lump was fairly painful. She also has had right lateral breast tenderness. No nipple drainage or discharge.     O:  /74 (BP Location: Left arm, Patient Position: Chair)   Pulse 69   Ht 1.651 m (5' 5\")   Wt 69.1 kg (152 lb 4.8 oz)   BMI 25.34 kg/m    Gen: Well-appearing, NAD  HEENT: Normocephalic, atraumatic  CV:  Regular rate  Pulm: Normal respiratory effort     Breast:Symmetric, no nipple discharge or retraction, no axillary lymphadenopathy, no palpable nodules or masses bilaterally, significant left tenderness under the nipple and areola     A/P:  Di Kennedy is a 31 year old  here for now resolved breast mass but ongoing breast pain/tenderness.  - dx mammogram and breast US ordered   - breast pain hand out given, discussed tracking sx.     If imaging is benign, follow-up after to discuss management options.     Kirill Caal MD  Ob/Gyn, PGY-4  2021, 4:20 PM    The Patient was seen in [facility] by KIRILL CAAL.  I reviewed the history & exam. Assessment and plan were jointly made.    Corinne Pires MD        "

## 2021-05-04 ENCOUNTER — ANCILLARY PROCEDURE (OUTPATIENT)
Dept: MAMMOGRAPHY | Facility: CLINIC | Age: 31
End: 2021-05-04
Attending: STUDENT IN AN ORGANIZED HEALTH CARE EDUCATION/TRAINING PROGRAM
Payer: COMMERCIAL

## 2021-05-04 ENCOUNTER — ALLIED HEALTH/NURSE VISIT (OUTPATIENT)
Dept: INTERPRETER SERVICES | Facility: CLINIC | Age: 31
End: 2021-05-04
Payer: COMMERCIAL

## 2021-05-04 DIAGNOSIS — N64.4 BREAST PAIN: ICD-10-CM

## 2021-05-04 PROCEDURE — 76642 ULTRASOUND BREAST LIMITED: CPT | Mod: LT | Performed by: STUDENT IN AN ORGANIZED HEALTH CARE EDUCATION/TRAINING PROGRAM

## 2021-05-04 PROCEDURE — T1013 SIGN LANG/ORAL INTERPRETER: HCPCS | Mod: U4 | Performed by: STUDENT IN AN ORGANIZED HEALTH CARE EDUCATION/TRAINING PROGRAM

## 2021-05-04 NOTE — LETTER
Di Edgarcosmo  [address] [facility] [address]  [place] [address]            May 4, 2021    Date of Exam:     Dear Di:    Thank you for your recent visit.    Breast Imaging Result: Your breast imaging examination showed an area that we believe is benign (not cancer). We recommend that you come back again in six weeks for a short term follow-up ultrasound, or in certain instances, biopsy. If you have not already scheduled this follow-up exam, please call [phone].     As you know, early detection of cancer is very important. Although not all cancers are found through breast imaging it is the most accurate method for early detection. A thorough examination includes a combination of breast imaging and a physical examination by your health care professional. Therefore, if you have not had a recent physical exam of your breasts see your health care team.    A report of your breast imaging results was sent to: Padmini Caal    Your breast imaging will become part of your medical file here at [facility] for at least 10 years. You are responsible for informing any new health care team or breast imaging facility of the date and location of this examination.    We appreciate the opportunity to participate in your health care.    Sincerely,  Selina Sanders MD  [facility] [place]

## 2021-05-04 NOTE — RESULT ENCOUNTER NOTE
Jeanmarie Sevilla,     Your breast ultra sound was reassuring overall. No solid areas were found and there were no discrete fluid collections to drain. You can get a repeat breast ultra sound in 6 weeks or if symptoms do not resolve sooner, follow up with Dr. Caal or another one of the OBGYN doctors in clinic.     Take care,   Dr. Quinones

## 2021-06-07 NOTE — PROGRESS NOTES
SUBJECTIVE: Here for curbside evaluation for COVID-19 referred through [facility]. Confirmed [facility] with name and date of birth for specimen collection.         1. Cough       Instructed patient to follow up with [facility] for return to work process. If questions or concerns arise advised to follow up with [facility] team.     Marilee Sifuentes

## 2021-07-08 DIAGNOSIS — Z11.3 ROUTINE SCREENING FOR STI (SEXUALLY TRANSMITTED INFECTION): ICD-10-CM

## 2021-07-08 RX ORDER — NORGESTIMATE AND ETHINYL ESTRADIOL 0.25-0.035
1 KIT ORAL DAILY
Qty: 28 TABLET | Refills: 11 | Status: SHIPPED | OUTPATIENT
Start: 2021-07-08 | End: 2021-12-23

## 2021-08-31 ENCOUNTER — LAB REQUISITION (OUTPATIENT)
Dept: LAB | Facility: CLINIC | Age: 31
End: 2021-08-31

## 2021-08-31 ENCOUNTER — APPOINTMENT (OUTPATIENT)
Dept: FAMILY MEDICINE | Facility: CLINIC | Age: 31
End: 2021-08-31
Payer: COMMERCIAL

## 2021-08-31 LAB — SARS-COV-2 RNA RESP QL NAA+PROBE: NEGATIVE

## 2021-08-31 PROCEDURE — U0005 INFEC AGEN DETEC AMPLI PROBE: HCPCS | Performed by: INTERNAL MEDICINE

## 2021-09-07 ENCOUNTER — IMMUNIZATION (OUTPATIENT)
Dept: FAMILY MEDICINE | Facility: CLINIC | Age: 31
End: 2021-09-07
Payer: COMMERCIAL

## 2021-09-07 PROCEDURE — 91300 PR COVID VAC PFIZER DIL RECON 30 MCG/0.3 ML IM: CPT

## 2021-09-07 PROCEDURE — 0001A PR COVID VAC PFIZER DIL RECON 30 MCG/0.3 ML IM: CPT

## 2021-10-01 ENCOUNTER — IMMUNIZATION (OUTPATIENT)
Dept: FAMILY MEDICINE | Facility: CLINIC | Age: 31
End: 2021-10-01
Attending: FAMILY MEDICINE
Payer: COMMERCIAL

## 2021-10-01 PROCEDURE — 91300 PR COVID VAC PFIZER DIL RECON 30 MCG/0.3 ML IM: CPT

## 2021-10-01 PROCEDURE — 0002A PR COVID VAC PFIZER DIL RECON 30 MCG/0.3 ML IM: CPT

## 2021-10-04 ENCOUNTER — HEALTH MAINTENANCE LETTER (OUTPATIENT)
Age: 31
End: 2021-10-04

## 2021-12-23 ENCOUNTER — ANCILLARY PROCEDURE (OUTPATIENT)
Dept: ULTRASOUND IMAGING | Facility: CLINIC | Age: 31
End: 2021-12-23
Attending: ADVANCED PRACTICE MIDWIFE
Payer: COMMERCIAL

## 2021-12-23 ENCOUNTER — OFFICE VISIT (OUTPATIENT)
Dept: OBGYN | Facility: CLINIC | Age: 31
End: 2021-12-23
Attending: ADVANCED PRACTICE MIDWIFE
Payer: COMMERCIAL

## 2021-12-23 VITALS
WEIGHT: 145 LBS | HEART RATE: 86 BPM | SYSTOLIC BLOOD PRESSURE: 113 MMHG | BODY MASS INDEX: 24.16 KG/M2 | DIASTOLIC BLOOD PRESSURE: 71 MMHG | HEIGHT: 65 IN

## 2021-12-23 DIAGNOSIS — Z36.89 ENCOUNTER FOR FETAL ANATOMIC SURVEY: ICD-10-CM

## 2021-12-23 DIAGNOSIS — E55.9 VITAMIN D DEFICIENCY: ICD-10-CM

## 2021-12-23 DIAGNOSIS — Z34.81 NORMAL PREGNANCY IN MULTIGRAVIDA IN FIRST TRIMESTER: Primary | ICD-10-CM

## 2021-12-23 DIAGNOSIS — Z13.71 SCREENING FOR GENETIC DISEASE CARRIER STATUS: ICD-10-CM

## 2021-12-23 DIAGNOSIS — O26.90 PREGNANCY RELATED CONDITION, ANTEPARTUM: ICD-10-CM

## 2021-12-23 DIAGNOSIS — Z34.80 NORMAL PREGNANCY IN MULTIGRAVIDA: ICD-10-CM

## 2021-12-23 PROBLEM — Z12.4 CERVICAL CANCER SCREENING: Status: ACTIVE | Noted: 2017-03-09

## 2021-12-23 PROCEDURE — G0463 HOSPITAL OUTPT CLINIC VISIT: HCPCS | Mod: 25

## 2021-12-23 PROCEDURE — 99207 PR PRENATAL VISIT: CPT | Performed by: ADVANCED PRACTICE MIDWIFE

## 2021-12-23 PROCEDURE — 76817 TRANSVAGINAL US OBSTETRIC: CPT | Mod: 26 | Performed by: OBSTETRICS & GYNECOLOGY

## 2021-12-23 PROCEDURE — 76817 TRANSVAGINAL US OBSTETRIC: CPT

## 2021-12-23 RX ORDER — PRENATAL VIT/IRON FUM/FOLIC AC 27MG-0.8MG
1 TABLET ORAL DAILY
COMMUNITY
End: 2022-11-03

## 2021-12-23 ASSESSMENT — PAIN SCALES - GENERAL: PAINLEVEL: NO PAIN (0)

## 2021-12-23 ASSESSMENT — MIFFLIN-ST. JEOR: SCORE: 1383.6

## 2021-12-23 NOTE — NURSING NOTE
Chief Complaint   Patient presents with     Prenatal Care     VELASQUEZ RIANNA 8/4/2022   Tammy Ness LPN

## 2021-12-23 NOTE — PROGRESS NOTES
[facility] OB Intake note  Subjective   31 year old female presents to clinic for initiation of OB care. Patient's last menstrual period was 10/28/2021.  at 8w0d by Estimated Date of Delivery: Aug 4, 2022 based on LMP. Reviewed dating ultrasound. Pregnancy is planned.    Partner name - Bora.        Symptoms since LMP include nausea. Patient has tried these relief measures: diet modification.    - Genetic/Infection questionnaire completed, risks include patient's father diagnosed with early Alzheimer's,  at age 62. Pt  does not have a recent known exposure to Parvo or CMV so IgG/IgM testing WILL NOT be ordered.   Recommended Flu Vaccine.  Patient already received Flu Vaccine    - Current Medications    Current Outpatient Medications   Medication Sig Dispense Refill     Prenatal Vit-Fe Fumarate-FA (PRENATAL MULTIVITAMIN W/IRON) 27-0.8 MG tablet Take 1 tablet by mouth daily           - Co-morbids    Past Medical History:   Diagnosis Date     Known health problems: none      - Risk for GDM : First degree relative with GDM or DM  and Personal history of of prior macrosomia in newbornso  WILL NOT have an early GCT and Hgb A1C.  Discussed recommendation, patient declines    - High risk factors for Pre E-  No known risk factors of High risk for Pre E       - Moderate risk factor for Pre E No moderate risk factors  Meets one high risk factors or none of the moderate risk facrtors  so WILL NOT consider starting low dose aspirin (81mg) starting between 12 and 28 weeks to prevent early onset preeclampsia    - The patient  does not have a history of spontaneous  birth so  WILL NOT consider progesterone starting at 16-20 weeks and/or serial transvaginal cervical length ultrasounds from 16-24 weeks.     -The patient does not have a history of immunosuppresion or HIV so Toxoplasma IgG/IgM WILL NOT be ordered.           PERSONAL/SOCIAL HISTORY    lives with their family.  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  Job involves sedentary activity .  Her partner works as a .   History of anxiety or depression  no  Additional items: None    Objective  -VS: reviewed and within normal limits   -General appearance: no acute distress, patient is comfortable   NEUROLOGICAL/PSYCHIATRIC   - Orientated x3,   -Mood and affect: : normal     Assessment/Plan  Di was seen today for prenatal care.    Diagnoses and all orders for this visit:    Normal pregnancy in multigravida in first trimester  -     ABO/Rh Type and Screen; Future  -     CBC with Platelets Differential; Future  -     Hepatitis B Surface Antigen; Future  -     HIV Antigen Antibody Combo; Future  -     Rubella Antibody IgG; Future  -     Treponema Abs w Reflex to RPR and Titer; Future  -     Urine Culture Aerobic Bacterial; Future  -     Hepatitis C antibody; Future  -     Hepatitis B Surface Antibody; Future    Vitamin D deficiency  -     25- OH-Vitamin D; Future    Normal pregnancy in multigravida        31 year old  8w0d weeks of pregnancy with RIANNA of Aug 4, 2022 by LMP of Patient's last menstrual period was 10/28/2021.. Ultrasound confirms.   Outpatient Encounter Medications as of 2021   Medication Sig Dispense Refill     Prenatal Vit-Fe Fumarate-FA (PRENATAL MULTIVITAMIN W/IRON) 27-0.8 MG tablet Take 1 tablet by mouth daily       [DISCONTINUED] fluconazole (DIFLUCAN) 150 MG tablet Take one at onset of symptoms, then repeat in 5 days if needed (Patient not taking: Reported on 3/22/2021) 2 tablet 1     [DISCONTINUED] norgestimate-ethinyl estradiol (ORTHO-CYCLEN) 0.25-35 MG-MCG tablet Take 1 tablet by mouth daily (Patient not taking: Reported on 2021) 28 tablet 11     No facility-administered encounter medications on file as of 2021.      Orders Placed This Encounter   Procedures     25- OH-Vitamin D     Hepatitis B Surface Antigen     HIV Antigen Antibody Combo     Rubella Antibody IgG      Treponema Abs w Reflex to RPR and Titer     Hepatitis C antibody     Hepatitis B Surface Antibody                 Orders Placed This Encounter   Procedures     25- OH-Vitamin D     Hepatitis B Surface Antigen     HIV Antigen Antibody Combo     Rubella Antibody IgG     Treponema Abs w Reflex to RPR and Titer     Hepatitis C antibody     Hepatitis B Surface Antibody     ABO/Rh Type and Screen     CBC with Platelets Differential         - Oriented to Practice, types of care, and how to reach a provider.  Pt prefers CNM team  - Patient received 1st trimester new OB education packet complete with aide of [employer] booklet including information on genetic screening test options.  - Patient desires 1st trimester screening and desires level II ultrasound which were ordered.  - Educational handout on the prevention of infections diseases during pregnancy provided.  - Patient was encouraged to start prenatal vitamins as tolerated.    - Patient was sent to lab for routine OB labs.    - Reviewed risk for diabetes in pregnancy, pt declined early screening. Plans routine screening in third trimester  - Pregnancy concerns to be addressed by provider at new OB exam include: none.    Pt to RTO for NOB visit in 4 weeks and prn if questions or concerns    ANNA Thompson CNM

## 2021-12-23 NOTE — LETTER
2021       RE: Di Kennedy  [address] [place] [address]     Dear Colleague,    Thank you for referring your patient, Di Kennedy, to the [facility] [place] at [facility]. Please see a copy of my visit note below.    [facility] OB Intake note  Subjective   31 year old female presents to clinic for initiation of OB care. Patient's last menstrual period was 10/28/2021.  at 8w0d by Estimated Date of Delivery: Aug 4, 2022 based on LMP. Reviewed dating ultrasound. Pregnancy is planned.    Partner name - Bora.        Symptoms since LMP include nausea. Patient has tried these relief measures: diet modification.    - Genetic/Infection questionnaire completed, risks include patient's father diagnosed with early Alzheimer's,  at age 62. Pt  does not have a recent known exposure to Parvo or CMV so IgG/IgM testing WILL NOT be ordered.   Recommended Flu Vaccine.  Patient already received Flu Vaccine    - Current Medications    Current Outpatient Medications   Medication Sig Dispense Refill     Prenatal Vit-Fe Fumarate-FA (PRENATAL MULTIVITAMIN W/IRON) 27-0.8 MG tablet Take 1 tablet by mouth daily           - Co-morbids    Past Medical History:   Diagnosis Date     Known health problems: none      - Risk for GDM : First degree relative with GDM or DM  and Personal history of of prior macrosomia in newbornso  WILL NOT have an early GCT and Hgb A1C.  Discussed recommendation, patient declines    - High risk factors for Pre E-  No known risk factors of High risk for Pre E       - Moderate risk factor for Pre E No moderate risk factors  Meets one high risk factors or none of the moderate risk facrtors  so WILL NOT consider starting low dose aspirin (81mg) starting between 12 and 28 weeks to prevent early onset preeclampsia    - The patient  does not have a history of spontaneous  birth so  WILL NOT consider progesterone starting at  16-20 weeks and/or serial transvaginal cervical length ultrasounds from 16-24 weeks.     -The patient does not have a history of immunosuppresion or HIV so Toxoplasma IgG/IgM WILL NOT be ordered.           PERSONAL/SOCIAL HISTORY    lives with their family.  Employment: Full time as a patient  on [employer].  Job involves sedentary activity .  Her partner works as a .   History of anxiety or depression  no  Additional items: None    Objective  -VS: reviewed and within normal limits   -General appearance: no acute distress, patient is comfortable   NEUROLOGICAL/PSYCHIATRIC   - Orientated x3,   -Mood and affect: : normal     Assessment/Plan  Di was seen today for prenatal care.    Diagnoses and all orders for this visit:    Normal pregnancy in multigravida in first trimester  -     ABO/Rh Type and Screen; Future  -     CBC with Platelets Differential; Future  -     Hepatitis B Surface Antigen; Future  -     HIV Antigen Antibody Combo; Future  -     Rubella Antibody IgG; Future  -     Treponema Abs w Reflex to RPR and Titer; Future  -     Urine Culture Aerobic Bacterial; Future  -     Hepatitis C antibody; Future  -     Hepatitis B Surface Antibody; Future    Vitamin D deficiency  -     25- OH-Vitamin D; Future    Normal pregnancy in multigravida        31 year old  8w0d weeks of pregnancy with RIANNA of Aug 4, 2022 by LMP of Patient's last menstrual period was 10/28/2021.. Ultrasound confirms.   Outpatient Encounter Medications as of 2021   Medication Sig Dispense Refill     Prenatal Vit-Fe Fumarate-FA (PRENATAL MULTIVITAMIN W/IRON) 27-0.8 MG tablet Take 1 tablet by mouth daily       [DISCONTINUED] fluconazole (DIFLUCAN) 150 MG tablet Take one at onset of symptoms, then repeat in 5 days if needed (Patient not taking: Reported on 3/22/2021) 2 tablet 1     [DISCONTINUED] norgestimate-ethinyl estradiol (ORTHO-CYCLEN) 0.25-35 MG-MCG tablet Take 1 tablet by  mouth daily (Patient not taking: Reported on 12/23/2021) 28 tablet 11     No facility-administered encounter medications on file as of 12/23/2021.      Orders Placed This Encounter   Procedures     25- OH-Vitamin D     Hepatitis B Surface Antigen     HIV Antigen Antibody Combo     Rubella Antibody IgG     Treponema Abs w Reflex to RPR and Titer     Hepatitis C antibody     Hepatitis B Surface Antibody                 Orders Placed This Encounter   Procedures     25- OH-Vitamin D     Hepatitis B Surface Antigen     HIV Antigen Antibody Combo     Rubella Antibody IgG     Treponema Abs w Reflex to RPR and Titer     Hepatitis C antibody     Hepatitis B Surface Antibody     ABO/Rh Type and Screen     CBC with Platelets Differential         - Oriented to Practice, types of care, and how to reach a provider.  Pt prefers CNM team  - Patient received 1st trimester new OB education packet complete with aide of [employer] Expectant Family booklet including information on genetic screening test options.  - Patient desires 1st trimester screening and desires level II ultrasound which were ordered.  - Educational handout on the prevention of infections diseases during pregnancy provided.  - Patient was encouraged to start prenatal vitamins as tolerated.    - Patient was sent to lab for routine OB labs.    - Reviewed risk for diabetes in pregnancy, pt declined early screening. Plans routine screening in third trimester  - Pregnancy concerns to be addressed by provider at new OB exam include: none.    Pt to RTO for NOB visit in 4 weeks and prn if questions or concerns            Again, thank you for allowing me to participate in the care of your patient.      Sincerely,    ANNA Thompson CNM

## 2021-12-27 ENCOUNTER — TRANSCRIBE ORDERS (OUTPATIENT)
Dept: OBGYN | Facility: CLINIC | Age: 31
End: 2021-12-27
Payer: COMMERCIAL

## 2021-12-27 DIAGNOSIS — O26.90 PREGNANCY RELATED CONDITION, ANTEPARTUM: Primary | ICD-10-CM

## 2021-12-28 ENCOUNTER — LAB REQUISITION (OUTPATIENT)
Dept: LAB | Facility: CLINIC | Age: 31
End: 2021-12-28

## 2021-12-28 ENCOUNTER — APPOINTMENT (OUTPATIENT)
Dept: URGENT CARE | Facility: URGENT CARE | Age: 31
End: 2021-12-28
Payer: COMMERCIAL

## 2021-12-28 PROCEDURE — U0005 INFEC AGEN DETEC AMPLI PROBE: HCPCS | Performed by: INTERNAL MEDICINE

## 2021-12-29 ENCOUNTER — TELEPHONE (OUTPATIENT)
Dept: OBGYN | Facility: CLINIC | Age: 31
End: 2021-12-29
Payer: COMMERCIAL

## 2021-12-29 LAB — SARS-COV-2 RNA RESP QL NAA+PROBE: NEGATIVE

## 2021-12-29 NOTE — TELEPHONE ENCOUNTER
Message received from patient stating she and daughter had eaten recalled salad for listeria.     Called and spoke with patient. She reports that over the weekend she had fever, nausea, vomiting, diarrhea. Got Covid tested that came back negative today. States her daughter now has similar symptoms. States her symptoms have resolved, has mild headache and some cramping with activity. Denies vaginal bleeding.     Discussed making sure she is well hydrated and replacing fluids lost. Instructed patient to call back if symptoms worsen or if she begins to have moderate-severe pain and or vaginal bleeding. Pt verbalized understanding and agreement.

## 2022-01-04 ENCOUNTER — LAB (OUTPATIENT)
Dept: LAB | Facility: CLINIC | Age: 32
End: 2022-01-04
Payer: COMMERCIAL

## 2022-01-04 DIAGNOSIS — Z34.81 NORMAL PREGNANCY IN MULTIGRAVIDA IN FIRST TRIMESTER: ICD-10-CM

## 2022-01-04 DIAGNOSIS — E55.9 VITAMIN D DEFICIENCY: ICD-10-CM

## 2022-01-04 LAB
ABO/RH(D): NORMAL
ANTIBODY SCREEN: NEGATIVE
BASOPHILS # BLD AUTO: 0.1 10E3/UL (ref 0–0.2)
BASOPHILS NFR BLD AUTO: 1 %
EOSINOPHIL # BLD AUTO: 0.3 10E3/UL (ref 0–0.7)
EOSINOPHIL NFR BLD AUTO: 3 %
ERYTHROCYTE [DISTWIDTH] IN BLOOD BY AUTOMATED COUNT: 12.2 % (ref 10–15)
HCT VFR BLD AUTO: 35.2 % (ref 35–47)
HGB BLD-MCNC: 12 G/DL (ref 11.7–15.7)
IMM GRANULOCYTES # BLD: 0 10E3/UL
IMM GRANULOCYTES NFR BLD: 0 %
LYMPHOCYTES # BLD AUTO: 2.4 10E3/UL (ref 0.8–5.3)
LYMPHOCYTES NFR BLD AUTO: 28 %
MCH RBC QN AUTO: 30.8 PG (ref 26.5–33)
MCHC RBC AUTO-ENTMCNC: 34.1 G/DL (ref 31.5–36.5)
MCV RBC AUTO: 90 FL (ref 78–100)
MONOCYTES # BLD AUTO: 0.6 10E3/UL (ref 0–1.3)
MONOCYTES NFR BLD AUTO: 7 %
NEUTROPHILS # BLD AUTO: 5.4 10E3/UL (ref 1.6–8.3)
NEUTROPHILS NFR BLD AUTO: 61 %
NRBC # BLD AUTO: 0 10E3/UL
NRBC BLD AUTO-RTO: 0 /100
PLATELET # BLD AUTO: 334 10E3/UL (ref 150–450)
RBC # BLD AUTO: 3.9 10E6/UL (ref 3.8–5.2)
SPECIMEN EXPIRATION DATE: NORMAL
WBC # BLD AUTO: 8.7 10E3/UL (ref 4–11)

## 2022-01-04 PROCEDURE — 99000 SPECIMEN HANDLING OFFICE-LAB: CPT | Performed by: PATHOLOGY

## 2022-01-04 PROCEDURE — 87086 URINE CULTURE/COLONY COUNT: CPT | Mod: 90 | Performed by: PATHOLOGY

## 2022-01-04 PROCEDURE — 86803 HEPATITIS C AB TEST: CPT | Mod: 90 | Performed by: PATHOLOGY

## 2022-01-04 PROCEDURE — 82306 VITAMIN D 25 HYDROXY: CPT | Mod: 90 | Performed by: PATHOLOGY

## 2022-01-04 PROCEDURE — 85025 COMPLETE CBC W/AUTO DIFF WBC: CPT | Performed by: PATHOLOGY

## 2022-01-04 PROCEDURE — 86901 BLOOD TYPING SEROLOGIC RH(D): CPT | Mod: 90 | Performed by: PATHOLOGY

## 2022-01-04 PROCEDURE — 86762 RUBELLA ANTIBODY: CPT | Mod: 90 | Performed by: PATHOLOGY

## 2022-01-04 PROCEDURE — 86780 TREPONEMA PALLIDUM: CPT | Mod: 90 | Performed by: PATHOLOGY

## 2022-01-04 PROCEDURE — 86850 RBC ANTIBODY SCREEN: CPT | Mod: 90 | Performed by: PATHOLOGY

## 2022-01-04 PROCEDURE — 87340 HEPATITIS B SURFACE AG IA: CPT | Mod: 90 | Performed by: PATHOLOGY

## 2022-01-04 PROCEDURE — 86900 BLOOD TYPING SEROLOGIC ABO: CPT | Mod: 90 | Performed by: PATHOLOGY

## 2022-01-04 PROCEDURE — 86706 HEP B SURFACE ANTIBODY: CPT | Mod: 90 | Performed by: PATHOLOGY

## 2022-01-04 PROCEDURE — 36415 COLL VENOUS BLD VENIPUNCTURE: CPT | Performed by: PATHOLOGY

## 2022-01-04 PROCEDURE — 87389 HIV-1 AG W/HIV-1&-2 AB AG IA: CPT | Mod: 90 | Performed by: PATHOLOGY

## 2022-01-05 ENCOUNTER — TELEPHONE (OUTPATIENT)
Dept: OBGYN | Facility: CLINIC | Age: 32
End: 2022-01-05

## 2022-01-05 PROBLEM — E55.9 VITAMIN D DEFICIENCY: Status: ACTIVE | Noted: 2022-01-05

## 2022-01-05 PROBLEM — Z34.80 NORMAL PREGNANCY IN MULTIGRAVIDA: Status: ACTIVE | Noted: 2021-12-23

## 2022-01-05 LAB
BACTERIA UR CULT: NO GROWTH
DEPRECATED CALCIDIOL+CALCIFEROL SERPL-MC: 11 UG/L (ref 20–75)
HBV SURFACE AB SERPL IA-ACNC: 40.93 M[IU]/ML
HBV SURFACE AG SERPL QL IA: NONREACTIVE
HCV AB SERPL QL IA: NONREACTIVE
HIV 1+2 AB+HIV1 P24 AG SERPL QL IA: NONREACTIVE
RUBV IGG SERPL QL IA: 5.33 INDEX
RUBV IGG SERPL QL IA: POSITIVE
T PALLIDUM AB SER QL: NONREACTIVE

## 2022-01-05 NOTE — TELEPHONE ENCOUNTER
----- Message from Magaly Ramos RN sent at 1/4/2022  1:58 PM CST -----  Regarding: pt has pain  Pt is working until 430 - please call [phone]

## 2022-01-05 NOTE — TELEPHONE ENCOUNTER
Pt reports that she started experiencing L sided pain yesterday from her hip to her ribs.  This feels to her like it is in the muscle, though she cannot recall any injury that would have precipitated it.  She does not have current GI symptoms, though does report some gassiness and bloating last week after consuming dairy.  Denies bleeding or spotting.    Will try tylenol, heat/ice for now.  If pain is worsening or not resolving, to call back.  If she is having extreme pain, will seek eval in ED

## 2022-01-19 ENCOUNTER — PRE VISIT (OUTPATIENT)
Dept: MATERNAL FETAL MEDICINE | Facility: CLINIC | Age: 32
End: 2022-01-19

## 2022-01-21 ENCOUNTER — OFFICE VISIT (OUTPATIENT)
Dept: MATERNAL FETAL MEDICINE | Facility: CLINIC | Age: 32
End: 2022-01-21
Attending: ADVANCED PRACTICE MIDWIFE
Payer: COMMERCIAL

## 2022-01-21 ENCOUNTER — LAB (OUTPATIENT)
Dept: LAB | Facility: CLINIC | Age: 32
End: 2022-01-21
Attending: ADVANCED PRACTICE MIDWIFE
Payer: COMMERCIAL

## 2022-01-21 ENCOUNTER — HOSPITAL ENCOUNTER (OUTPATIENT)
Dept: ULTRASOUND IMAGING | Facility: CLINIC | Age: 32
End: 2022-01-21
Attending: ADVANCED PRACTICE MIDWIFE
Payer: COMMERCIAL

## 2022-01-21 DIAGNOSIS — Z36.9 ANTENATAL SCREENING ENCOUNTER: Primary | ICD-10-CM

## 2022-01-21 DIAGNOSIS — Z36.9 ANTENATAL SCREENING ENCOUNTER: ICD-10-CM

## 2022-01-21 DIAGNOSIS — Z36.82 ENCOUNTER FOR (NT) NUCHAL TRANSLUCENCY SCAN: Primary | ICD-10-CM

## 2022-01-21 DIAGNOSIS — O26.90 PREGNANCY RELATED CONDITION, ANTEPARTUM: ICD-10-CM

## 2022-01-21 PROCEDURE — 36415 COLL VENOUS BLD VENIPUNCTURE: CPT

## 2022-01-21 PROCEDURE — 76813 OB US NUCHAL MEAS 1 GEST: CPT | Mod: 26 | Performed by: OBSTETRICS & GYNECOLOGY

## 2022-01-21 PROCEDURE — 76813 OB US NUCHAL MEAS 1 GEST: CPT

## 2022-01-21 PROCEDURE — 96040 HC GENETIC COUNSELING, EACH 30 MINUTES: CPT

## 2022-01-21 NOTE — PROGRESS NOTES
[facility]  Genetic Counseling Consult    Patient: Di Kennedy YOB: 1990   Date of Service: 22      Di Kennedy was seen at [facility] for genetic consultation to discuss the options for routine screening for fetal chromosome abnormalities. Di was accompanied to the appointment today by her partner, Bora.        Impression/Plan:   1.  Di had an ultrasound and blood draw for NIPT (NIPS test through [facility]). The patient wishes to know the predicted genetic sex of the pregnancy and has elected to proceed with sex chromosome aneuploidy analysis. Results are expected within 7-10 days, and will be available in [facility].  We will contact her to discuss the results, and a copy will be forwarded to the office of the referring OB provider.     2. Maternal serum AFP (single marker screen) is recommended after 15 weeks to screen for open neural tube defects. A quad screen should not be performed.    Pregnancy History:   /Parity:     Age at Delivery: 32 year old  RIANNA: 2022, by Last Menstrual Period  Gestational Age: 12w1d    No significant complications or exposures were reported in the current pregnancy.    [place] pregnancy history is significant for:  o 2014: TAB, 5w0d  o 2015: Term , female  o 2017: Term , female    Medical History:   Di paredes reported medical history is not expected to impact pregnancy management or risks to fetal development.       Family History:   A three-generation pedigree was obtained, and is scanned under the  Media  tab.   The following significant findings were reported by Di:    The father of the pregnancy, Bora, is 30 and healthy.    Di reports that her brother has two children with autism, one daughter and one son through a different partner. Di also has a maternal first cousin with twin sons, one of whom has autism. Bora also reports th at his  maternal half-sister has one son with autism. It was reported that none of these individuals have other health concerns or physical differences. None of these individuals have had any genetic testing, to the couple's knowledge.   o Autism is a spectrum of developmental disorders characterized by impaired social interaction, problems with verbal and nonverbal communication, and unusual, repetitive, or severely limited activities and interests.  Autism is a heterogeneous disorder, including genetic and non-genetic causes. In a small percentage of individuals with ASD, it is a feature of a genetic condition such as Down syndrome, fragile X syndrome, or Rett syndrome. However, in most isolated cases the cause may be multifactorial, meaning a combination of genetic and environmental factors. Given that the most closely related relatives with autism are third degree relatives to the pregnancy, risk of autism spectrum disorders may be slightly increased over that of the general population for Di's pregnancy. Recommended discussing this family history with their pediatrician to ensure appropriate screening for autism spectrum disorders.     Bora reports that his maternal uncle was born with a congenital heart defect that required surgical repair. The exact type of heart defect is unknown. There is no other family history of congenital heart defects.   o Congenital heart defects can be isolated or part of a broader genetic syndrome. When isolated, congenital heart defects are usually multifactorial, meaning they are often caused by a combination of genetic and environmental factors. In general, the recurrence risk is likely increased for first and second degree relatives of an individual with a congenital heart defect. This pregnancy is a third degree relative to the family member with a congenital heart defect; therefore, the recurrence risk is likely equal to the population risk.     Di reports that she has one  maternal first cousin who has seizures and a limb/hand abnormality. This cousin has not had any genetic evaluation, to Di's knowledge.   o Seizure disorders including epilepsy can be isolated or part of a broader genetic syndrome. When isolated, seizure disorders often result from multiple interacting genetic and environmental factors and follow a multifactorial pattern of inheritance. Given there is thought to be a genetic component and this is a fourth degree relative to the pregnancy, the risk for recurrence is likely equal to the population risk in the current pregnancy. If more information about this history is gathered, it would be reasonable to revisit for a more accurate risk assessment.     Di reports that her father had Alzheimer's disease which was initially diagnosed in his 30's. Two paternal uncles also have Alzheimer's disease, at least one of whom had a similar early age of onset.  o The majority of Alzheimer's disease is thought to be due to a combination of genetic, lifestyle, and environmental factors. However a small portion can be due to genetic variants in a single gene, which can pass through families in an autosomal dominant fashion. If Di is interested in more information, we would be happy to connect her with genetic counseling in our neurology clinic.      Otherwise, the reported family history is negative for multiple miscarriages, stillbirths, birth defects, intellectual disability, known genetic conditions, and consanguinity.       Carrier Screening:   The patient reports that the father of the pregnancy has  ancestry:     Cystic fibrosis is an autosomal recessive genetic condition that occurs with increased frequency in individuals of  ancestry and carrier screening for this condition is available.  In addition,  screening in the [facility] includes cystic fibrosis.    The patient reports that the father of the pregnancy has   "ancestry and she has  ([place] and [place]) ancestry:     The hemoglobinopathies are a group of genetic blood diseases that occur with increased frequency in individuals of  ancestry and some groups of individuals of  ancestry and carrier screening for these conditions is available.  In addition,  screening in the [facility] includes many of the hemoglobinopathies.      Expanded carrier screening for mutations in a large panel of genes associated with autosomal recessive conditions including cystic fibrosis, spinal muscular atrophy, and others, is now available.      The patient has declined the carrier screening options reviewed today. She did take a brochure about her testing options and is aware that [facility] genetic counseling would be happy to help coordinate this at any point.        Risk Assessment for Chromosome Conditions:   We explained that the risk for fetal chromosome abnormalities increases with maternal age. We discussed specific features of common chromosome abnormalities, including Down syndrome, trisomy 13, trisomy 18, and sex chromosome trisomies.      - At age 32 at midtrimester, the risk to have a baby with Down syndrome is 1 in 690.     - At age 32 at midtrimester, the risk to have a baby with any chromosome abnormality is 1 in 345.     Possible Early Twin Demise:  Ultrasound on 2021 at 8w0d identified a possible second gestational sac with no fetal pole or yolk sac. We discussed the limitations of screening for a pregnancy with a possible early twin demise, sometimes called \"vanishing twin pregnancy\". Non-invasive prenatal testing (NIPT) analyzes maternal plasma cell-free DNA that originates from the placenta. It is unclear how long placental DNA from a demised twin might be present in the maternal bloodstream. Given the possibility of an early twin demise in this pregnancy, an abnormal NIPT result requires careful interpretation. Whether an " empty sac or blighted ovum can also contribute to cfDNA has not yet been ascertained. It is generally recommended by the testing laboratory that 4 weeks be allowed to elapse between the estimated time of the twin fetal demise and the maternal blood draw for screening. This recommended time is also typical for screening by biochemical methods.      Testing Options:   We discussed the following options:   First trimester screening    First trimester ultrasound with nuchal translucency and nasal bone assessments, maternal plasma hCG, ZELDA-A, and AFP measurement    Screens for fetal trisomy 21, trisomy 13, and trisomy 18    Cannot screen for open neural tube defects; maternal serum AFP after 15 weeks is recommended     Non-invasive Prenatal Testing (NIPT)    Maternal plasma cell-free DNA testing; first trimester ultrasound with nuchal translucency and nasal bone assessment is recommended, when appropriate    Screens for fetal trisomy 21, trisomy 13, trisomy 18, and sex chromosome aneuploidy    Cannot screen for open neural tube defects; maternal serum AFP after 15 weeks is recommended     Chorionic villus sampling (CVS)    Invasive procedure typically performed in the first trimester by which placental villi are obtained for the purpose of chromosome analysis and/or other prenatal genetic analysis    Diagnostic results; >99% sensitivity for fetal chromosome abnormalities    Cannot test for open neural tube defects; maternal serum AFP after 15 weeks is recommended     Genetic Amniocentesis    Invasive procedure typically performed in the second trimester by which amniotic fluid is obtained for the purpose of chromosome analysis and/or other prenatal genetic analysis    Diagnostic results; >99% sensitivity for fetal chromosome abnormalities    AFAFP measurement tests for open neural tube defects     Comprehensive (Level II) ultrasound: Detailed ultrasound performed between 18-22 weeks gestation to screen for major birth  defects and markers for aneuploidy.    We reviewed the benefits and limitations of this testing.  Screening tests provide a risk assessment specific to the pregnancy for certain fetal chromosome abnormalities, but cannot definitively diagnose or exclude a fetal chromosome abnormality.  Follow-up genetic counseling and consideration of diagnostic testing is recommended with any abnormal screening result.      It was a pleasure to be involved with [place] care. Face-to-face time of the meeting was 40 minutes.    Bella Mendoza [facility], [facility]  Licensed Genetic Counselor  [facility]  Maternal Fetal Medicine  Phone: [phone]  [email or web address]

## 2022-01-26 ENCOUNTER — TELEPHONE (OUTPATIENT)
Dept: MATERNAL FETAL MEDICINE | Facility: CLINIC | Age: 32
End: 2022-01-26

## 2022-01-26 NOTE — TELEPHONE ENCOUNTER
January 26, 2022    Di called and left a voicemail requesting the tracking number for her [facility] NIPT test so she can register her kit in the portal. I returned her call and she let me know that she called [facility] and they were able to give her this information.     Di's NIPT test is still pending and she will be contacted when results are available. Di did provide verbal permission to leave a detailed voicemail disclosing results if we reach her voicemail.     Bella Mendoza [facility], [facility]  Licensed Genetic Counselor  [facility]  Maternal Fetal Medicine  Phone: [phone]  [email or web address]

## 2022-02-01 ENCOUNTER — TELEPHONE (OUTPATIENT)
Dept: MATERNAL FETAL MEDICINE | Facility: CLINIC | Age: 32
End: 2022-02-01

## 2022-02-01 LAB — SCANNED LAB RESULT: NORMAL

## 2022-02-01 NOTE — TELEPHONE ENCOUNTER
February 1, 2022    Di returned my call and male fetal sex was disclosed at her request. We discussed the small chance of discordant sex results in the setting of early empty gestational sac. Fetal sex will be confirmed at anatomy ultrasound. Di voiced understanding and had no further questions.     Bella Mendoza, [facility], [facility]  Licensed Genetic Counselor  [facility]  Maternal Fetal Medicine  Phone: [phone]  [email or web address]

## 2022-02-01 NOTE — TELEPHONE ENCOUNTER
February 1, 2022    Left a message for Di regarding her NIPT results. A detailed voicemail was left regarding her results per previously established plan. I let Di know that she can call back if she would like fetal sex information and provided my direct contact number.     Results indicate NO ANEUPLOIDY DETECTED for chromosomes 21, 18, 13, or sex chromosomes.     This puts her current pregnancy at low risk for Down syndrome, trisomy 18, trisomy 13 and sex chromosome abnormalities. This test is reported to have the following sensitivities: Down syndrome: 99.99%, trisomy 18: 99.99%, and trisomy 13: 99.99%. Although these results are reassuring, this does not replace a standard chromosome analysis from a chorionic villus sampling or amniocentesis.     Level II ultrasound is scheduled for 3/4/2022.     MSAFP is the appropriate second trimester screening test for open neural tube defects; the maternal quad screen is not recommended.    Her results are available in her Epic chart for her primary OB to review.      Bella Mendoza, [facility], [facility]  Licensed Genetic Counselor  [facility]  Maternal Fetal Medicine  Phone: [phone]  [email or web address]

## 2022-03-02 NOTE — PROGRESS NOTES
Orbital.../Triceps.../Buccal... Please see the imaging tab for details of the ultrasound performed today.    Breann Knott MD  Specialist in Maternal-Fetal Medicine

## 2022-03-04 ENCOUNTER — OFFICE VISIT (OUTPATIENT)
Dept: MATERNAL FETAL MEDICINE | Facility: CLINIC | Age: 32
End: 2022-03-04
Attending: ADVANCED PRACTICE MIDWIFE
Payer: COMMERCIAL

## 2022-03-04 ENCOUNTER — HOSPITAL ENCOUNTER (OUTPATIENT)
Dept: ULTRASOUND IMAGING | Facility: CLINIC | Age: 32
End: 2022-03-04
Attending: ADVANCED PRACTICE MIDWIFE
Payer: COMMERCIAL

## 2022-03-04 DIAGNOSIS — O26.90 PREGNANCY RELATED CONDITION, ANTEPARTUM: ICD-10-CM

## 2022-03-04 DIAGNOSIS — O43.122 VELAMENTOUS INSERTION OF UMBILICAL CORD IN SECOND TRIMESTER: Primary | ICD-10-CM

## 2022-03-04 PROBLEM — O43.129 VELAMENTOUS INSERTION OF UMBILICAL CORD: Status: ACTIVE | Noted: 2022-03-04

## 2022-03-04 PROCEDURE — 76811 OB US DETAILED SNGL FETUS: CPT

## 2022-03-04 PROCEDURE — 76811 OB US DETAILED SNGL FETUS: CPT | Mod: 26 | Performed by: OBSTETRICS & GYNECOLOGY

## 2022-03-04 NOTE — PROGRESS NOTES
"Please see \"Imaging\" tab under \"Chart Review\" for details of today's US at the [facility].    Bam Calvin MD  Maternal-Fetal Medicine      "

## 2022-04-01 ENCOUNTER — HOSPITAL ENCOUNTER (OUTPATIENT)
Dept: ULTRASOUND IMAGING | Facility: CLINIC | Age: 32
Discharge: HOME | End: 2022-04-01
Attending: OBSTETRICS & GYNECOLOGY
Payer: COMMERCIAL

## 2022-04-01 ENCOUNTER — OFFICE VISIT (OUTPATIENT)
Dept: MATERNAL FETAL MEDICINE | Facility: CLINIC | Age: 32
End: 2022-04-01
Attending: OBSTETRICS & GYNECOLOGY
Payer: COMMERCIAL

## 2022-04-01 DIAGNOSIS — O43.122 VELAMENTOUS INSERTION OF UMBILICAL CORD IN SECOND TRIMESTER: Primary | ICD-10-CM

## 2022-04-01 DIAGNOSIS — O43.122 VELAMENTOUS INSERTION OF UMBILICAL CORD IN SECOND TRIMESTER: ICD-10-CM

## 2022-04-01 PROCEDURE — 76816 OB US FOLLOW-UP PER FETUS: CPT | Mod: 26 | Performed by: OBSTETRICS & GYNECOLOGY

## 2022-04-01 PROCEDURE — 76816 OB US FOLLOW-UP PER FETUS: CPT

## 2022-04-01 NOTE — PROGRESS NOTES
Di Kennedy was seen for an ultrasound today at the M[facility]Fetal Medicine center.      For the details of the ultrasound please see the report which can be found under the imaging tab.      Christina Gardner MD  , OB/GYN  Maternal-Fetal Medicine  [email or web address]  [phone] (Main M Office)  [phone] or [phone] (for 24 hour MFM questions)  [phone] (Pager)

## 2022-04-07 ENCOUNTER — NURSE TRIAGE (OUTPATIENT)
Dept: OBGYN | Facility: CLINIC | Age: 32
End: 2022-04-07

## 2022-04-07 DIAGNOSIS — O26.90 PREGNANCY RELATED CONDITION, ANTEPARTUM: Primary | ICD-10-CM

## 2022-04-29 ENCOUNTER — LAB (OUTPATIENT)
Dept: LAB | Facility: CLINIC | Age: 32
End: 2022-04-29
Attending: REGISTERED NURSE
Payer: COMMERCIAL

## 2022-04-29 ENCOUNTER — ANCILLARY PROCEDURE (OUTPATIENT)
Dept: ULTRASOUND IMAGING | Facility: CLINIC | Age: 32
End: 2022-04-29
Attending: OBSTETRICS & GYNECOLOGY
Payer: COMMERCIAL

## 2022-04-29 ENCOUNTER — OFFICE VISIT (OUTPATIENT)
Dept: OBGYN | Facility: CLINIC | Age: 32
End: 2022-04-29
Attending: REGISTERED NURSE
Payer: COMMERCIAL

## 2022-04-29 VITALS
DIASTOLIC BLOOD PRESSURE: 69 MMHG | SYSTOLIC BLOOD PRESSURE: 101 MMHG | WEIGHT: 169.2 LBS | BODY MASS INDEX: 28.19 KG/M2 | HEIGHT: 65 IN | HEART RATE: 77 BPM

## 2022-04-29 DIAGNOSIS — Z34.80 NORMAL PREGNANCY IN MULTIGRAVIDA: Primary | ICD-10-CM

## 2022-04-29 DIAGNOSIS — O26.90 PREGNANCY RELATED CONDITION, ANTEPARTUM: ICD-10-CM

## 2022-04-29 DIAGNOSIS — E55.9 VITAMIN D DEFICIENCY: ICD-10-CM

## 2022-04-29 DIAGNOSIS — R73.09 GLUCOSE TOLERANCE TEST ABNORMAL: ICD-10-CM

## 2022-04-29 DIAGNOSIS — R73.09 ABNORMAL GLUCOSE TOLERANCE TEST: ICD-10-CM

## 2022-04-29 DIAGNOSIS — Z91.89: ICD-10-CM

## 2022-04-29 DIAGNOSIS — Z34.80 NORMAL PREGNANCY IN MULTIGRAVIDA: ICD-10-CM

## 2022-04-29 LAB
ERYTHROCYTE [DISTWIDTH] IN BLOOD BY AUTOMATED COUNT: 12 % (ref 10–15)
GLUCOSE 1H P 50 G GLC PO SERPL-MCNC: 195 MG/DL (ref 70–129)
HCT VFR BLD AUTO: 33.4 % (ref 35–47)
HGB BLD-MCNC: 11.2 G/DL (ref 11.7–15.7)
MCH RBC QN AUTO: 30.4 PG (ref 26.5–33)
MCHC RBC AUTO-ENTMCNC: 33.5 G/DL (ref 31.5–36.5)
MCV RBC AUTO: 91 FL (ref 78–100)
PLATELET # BLD AUTO: 271 10E3/UL (ref 150–450)
RBC # BLD AUTO: 3.68 10E6/UL (ref 3.8–5.2)
WBC # BLD AUTO: 9.8 10E3/UL (ref 4–11)

## 2022-04-29 PROCEDURE — 99207 PR PRENATAL VISIT: CPT | Performed by: REGISTERED NURSE

## 2022-04-29 PROCEDURE — G0463 HOSPITAL OUTPT CLINIC VISIT: HCPCS

## 2022-04-29 PROCEDURE — 82950 GLUCOSE TEST: CPT

## 2022-04-29 PROCEDURE — 82306 VITAMIN D 25 HYDROXY: CPT

## 2022-04-29 PROCEDURE — 85027 COMPLETE CBC AUTOMATED: CPT

## 2022-04-29 PROCEDURE — 76816 OB US FOLLOW-UP PER FETUS: CPT

## 2022-04-29 PROCEDURE — 36415 COLL VENOUS BLD VENIPUNCTURE: CPT

## 2022-04-29 PROCEDURE — 86780 TREPONEMA PALLIDUM: CPT

## 2022-04-29 PROCEDURE — 87491 CHLMYD TRACH DNA AMP PROBE: CPT | Performed by: REGISTERED NURSE

## 2022-04-29 PROCEDURE — 76816 OB US FOLLOW-UP PER FETUS: CPT | Mod: 26 | Performed by: OBSTETRICS & GYNECOLOGY

## 2022-04-29 PROCEDURE — 87591 N.GONORRHOEAE DNA AMP PROB: CPT | Performed by: REGISTERED NURSE

## 2022-04-29 RX ORDER — ASPIRIN 81 MG
100 TABLET, DELAYED RELEASE (ENTERIC COATED) ORAL 2 TIMES DAILY
Qty: 90 TABLET | Refills: 1 | Status: SHIPPED | OUTPATIENT
Start: 2022-04-29 | End: 2022-11-03

## 2022-04-29 RX ORDER — MULTIVIT-MIN/IRON/FOLIC ACID/K 18-600-40
1 CAPSULE ORAL DAILY
Qty: 90 CAPSULE | Refills: 3 | Status: SHIPPED | OUTPATIENT
Start: 2022-04-29 | End: 2022-05-01

## 2022-04-29 NOTE — LETTER
"2022       RE: Di Kennedy  [address] [place] [address]     Dear Colleague,    Thank you for referring your patient, Di Kennedy, to the [facility] [place] [facility]. Please see a copy of my visit note below.    Subjective:  32 year old, , 26w1d presents for EOB visit.    Denies vaginal bleeding/LOF. Occasional BH contractions. + Fetal movement.   Denies, HA, vision changes, RUQ pain.     The patient presents with the following concerns:   - Having horrible constipation, not taking anything but is staying hydrated. Will go periods of 3-4 days without BM, did have one this morning. No bleeding.   - Hasn't been seen for CNM visit since 8w intake visit, has been going to scheduled ultrasounds at [facility] for velamentous cord insertion.  - Was hoping for water birth, discussed at length concern for waterbath with velamentous cord insertion.   - Reflects on birth of first baby that had 4 broken ribs, did not have a shoulder dystocia (delivery note reviewed noting \"tight shoulders\" without dystocia). 9lb baby.     Reviewed Growth US today for f/u of velamentous cord insertion: EFW 91%tile, cephalic, placenta anterior, 6.4cm MVP.  - Reviewed plan for growth US Q4 weeks and weekly BPPs at 36 weeks.     No flowsheet data found.  /69   Pulse 77   Ht 1.651 m (5' 5\")   Wt 76.7 kg (169 lb 3.2 oz)   LMP 10/28/2021   BMI 28.16 kg/m      Education completed today includes breast feeding, [facility] hand out, contraception, counting movements, signs of pre-term labor, when to present to birthplace, post partum depression, GBS, getting enough iron, nitrous oxide, doulas and vitamin K.  Birth preferences reviewed:  considering, hangout given. Desires un-medicated.   Labor support:     [place] Feeding plans:    Contraception planned: Paragard IUD  The following labs were ordered today:       GCT, " CBC w platelets, Vitamin D and Anti-treponema  Water birth consent form was not given.    Blood type:   Antibody Screen   Date Value Ref Range Status   01/04/2022 Negative Negative Final     Rhogam  was not given.  TDAP was not given. Plan for next visit.     A/P:  Encounter Diagnoses   Name Primary?     Normal pregnancy in multigravida Yes     Vitamin D deficiency      H/o of birth injury with first baby - 4 broken ribs w/o shoulder dystocia in 2015      Orders Placed This Encounter   Procedures     US OB Follow Up >14 Weeks     Vitamin D Deficiency     Glucose tolerance gest screen 1 hour     Treponema Abs w Reflex to RPR and Titer     CBC with platelets     Orders Placed This Encounter   Medications     docusate sodium (COLACE) 100 MG tablet     Sig: Take 1 tablet (100 mg) by mouth 2 times daily     Dispense:  90 tablet     Refill:  1     Cholecalciferol (VITAMIN D) 50 MCG (2000 UT) CAPS     Sig: Take 1 capsule by mouth daily     Dispense:  90 capsule     Refill:  3     - Order placed for f/u growth in 4 weeks, will plan to start weekly BPPs at 36 weeks for velamentous cord insertion.   - Reviewed concern for water birth with velamentous cord insertion, plan to use hydrotherapy and exit water before birth.   - Discussed OTC options for constipation, handout given on medications safe for use during pregnancy. Rx for colace sent. Advised daily metamucil with BID colace, miralax prn, adequate oral hydration. If concerned for rectal hemorrhoids or bleeding will notify provider for further recommendations.   - Was not taking Vit D supplement after IOB, will re-draw today, Rx sent for supplementation.   - Ordered urine GCCT    - At next visit: needs tdap    Continue scheduled prenatal care    ANNA Moser CNM

## 2022-04-29 NOTE — PATIENT INSTRUCTIONS
For constipation try Magnesium Oxide 350mg daily, probiotics (with bifudus), increase intake of fibrous foods (you can try smoothies or fiber supplements), water intake and exercise (try to exercise more days out of the week than not).   If you haven't had a bowel movement in 3 days you can use Milk of Magnesia, Doculax, or Lactulose sparingly

## 2022-04-29 NOTE — PROGRESS NOTES
"Subjective:  32 year old, , 26w1d presents for EOB visit.    Denies vaginal bleeding/LOF. Occasional BH contractions. + Fetal movement.   Denies, HA, vision changes, RUQ pain.     The patient presents with the following concerns:   - Having horrible constipation, not taking anything but is staying hydrated. Will go periods of 3-4 days without BM, did have one this morning. No bleeding.   - Hasn't been seen for CNM visit since 8w intake visit, has been going to scheduled ultrasounds at [facility] for velamentous cord insertion.  - Was hoping for water birth, discussed at length concern for waterbath with velamentous cord insertion.   - Reflects on birth of first baby that had 4 broken ribs, did not have a shoulder dystocia (delivery note reviewed noting \"tight shoulders\" without dystocia). 9lb baby.     Reviewed Growth US today for f/u of velamentous cord insertion: EFW 91%tile, cephalic, placenta anterior, 6.4cm MVP.  - Reviewed plan for growth US Q4 weeks and weekly BPPs at 36 weeks.     No flowsheet data found.  /69   Pulse 77   Ht 1.651 m (5' 5\")   Wt 76.7 kg (169 lb 3.2 oz)   LMP 10/28/2021   BMI 28.16 kg/m      Education completed today includes breast feeding, [facility] hand out, contraception, counting movements, signs of pre-term labor, when to present to birthplace, post partum depression, GBS, getting enough iron, nitrous oxide, doulas and vitamin K.  Birth preferences reviewed:  considering, hangout given. Desires un-medicated.   Labor support:      Feeding plans:    Contraception planned: Paragard IUD  The following labs were ordered today:       GCT, CBC w platelets, Vitamin D and Anti-treponema  Water birth consent form was not given.    Blood type:   Antibody Screen   Date Value Ref Range Status   2022 Negative Negative Final     Rhogam  was not given.  TDAP was not given. Plan for next visit.     A/P:  Encounter Diagnoses   Name Primary?     " Normal pregnancy in multigravida Yes     Vitamin D deficiency      H/o of birth injury with first baby - 4 broken ribs w/o shoulder dystocia in 2015      Orders Placed This Encounter   Procedures     US OB Follow Up >14 Weeks     Vitamin D Deficiency     Glucose tolerance gest screen 1 hour     Treponema Abs w Reflex to RPR and Titer     CBC with platelets     Orders Placed This Encounter   Medications     docusate sodium (COLACE) 100 MG tablet     Sig: Take 1 tablet (100 mg) by mouth 2 times daily     Dispense:  90 tablet     Refill:  1     Cholecalciferol (VITAMIN D) 50 MCG (2000 UT) CAPS     Sig: Take 1 capsule by mouth daily     Dispense:  90 capsule     Refill:  3     - Order placed for f/u growth in 4 weeks, will plan to start weekly BPPs at 36 weeks for velamentous cord insertion.   - Reviewed concern for water birth with velamentous cord insertion, plan to use hydrotherapy and exit water before birth.   - Discussed OTC options for constipation, handout given on medications safe for use during pregnancy. Rx for colace sent. Advised daily metamucil with BID colace, miralax prn, adequate oral hydration. If concerned for rectal hemorrhoids or bleeding will notify provider for further recommendations.   - Was not taking Vit D supplement after IOB, will re-draw today, Rx sent for supplementation.   - Ordered urine GCCT    - At next visit: needs tdap    Continue scheduled prenatal care    ANNA Moser CNM

## 2022-04-30 LAB
C TRACH DNA SPEC QL NAA+PROBE: NEGATIVE
DEPRECATED CALCIDIOL+CALCIFEROL SERPL-MC: 11 UG/L (ref 20–75)
N GONORRHOEA DNA SPEC QL NAA+PROBE: NEGATIVE
T PALLIDUM AB SER QL: NONREACTIVE

## 2022-05-01 DIAGNOSIS — E55.9 VITAMIN D DEFICIENCY: Primary | ICD-10-CM

## 2022-05-03 ENCOUNTER — LAB (OUTPATIENT)
Dept: LAB | Facility: CLINIC | Age: 32
End: 2022-05-03
Payer: COMMERCIAL

## 2022-05-03 DIAGNOSIS — R73.09 GLUCOSE TOLERANCE TEST ABNORMAL: ICD-10-CM

## 2022-05-03 DIAGNOSIS — O24.410 DIET CONTROLLED GESTATIONAL DIABETES MELLITUS (GDM) IN SECOND TRIMESTER: Primary | ICD-10-CM

## 2022-05-03 DIAGNOSIS — Z34.80 NORMAL PREGNANCY IN MULTIGRAVIDA: ICD-10-CM

## 2022-05-03 LAB
GESTATIONAL GTT 1 HR POST DOSE: 183 MG/DL (ref 60–179)
GESTATIONAL GTT 2 HR POST DOSE: 166 MG/DL (ref 60–154)
GESTATIONAL GTT 3 HR POST DOSE: 157 MG/DL (ref 60–139)
GLUCOSE P FAST SERPL-MCNC: 86 MG/DL (ref 60–94)

## 2022-05-03 PROCEDURE — 82951 GLUCOSE TOLERANCE TEST (GTT): CPT

## 2022-05-03 PROCEDURE — 36415 COLL VENOUS BLD VENIPUNCTURE: CPT

## 2022-05-03 PROCEDURE — 82947 ASSAY GLUCOSE BLOOD QUANT: CPT

## 2022-05-03 PROCEDURE — 82950 GLUCOSE TEST: CPT

## 2022-05-03 RX ORDER — BLOOD-GLUCOSE METER
EACH MISCELLANEOUS
Qty: 1 KIT | Refills: 0 | Status: SHIPPED | OUTPATIENT
Start: 2022-05-03 | End: 2022-11-03

## 2022-05-04 ENCOUNTER — TELEPHONE (OUTPATIENT)
Dept: OBGYN | Facility: CLINIC | Age: 32
End: 2022-05-04

## 2022-05-04 NOTE — TELEPHONE ENCOUNTER
Diabetes Education Scheduling Outreach #1:    Call to patient to schedule. Left message with phone number to call to schedule.    Plan for 2nd outreach attempt within 1 business day.    [facility]  Diabetes and Nutrition Scheduling

## 2022-05-04 NOTE — TELEPHONE ENCOUNTER
[facility] Call Center    Phone Message    May a detailed message be left on voicemail: yes     Reason for Call: Form or Letter   Type or form/letter needing completion: FMLA  Provider: Jada Bah  Date form needed: asap    Patient is letting care team know that she is faxing over FMLA form to clinic to be filled out. Please look out for that. Thank you     Action Taken: Message routed to:  Other: [facility]    Travel Screening: Not Applicable

## 2022-05-05 ENCOUNTER — MYC MEDICAL ADVICE (OUTPATIENT)
Dept: OBGYN | Facility: CLINIC | Age: 32
End: 2022-05-05

## 2022-05-05 NOTE — TELEPHONE ENCOUNTER
Paperwork completed signed and faxed 5-5-22  Copy was made,        Paperwork has been received, placed in providers mailbox for completion. Routing to rooming staff.

## 2022-05-11 ENCOUNTER — VIRTUAL VISIT (OUTPATIENT)
Dept: EDUCATION SERVICES | Facility: CLINIC | Age: 32
End: 2022-05-11
Attending: MIDWIFE
Payer: COMMERCIAL

## 2022-05-11 DIAGNOSIS — O24.410 DIET CONTROLLED GESTATIONAL DIABETES MELLITUS (GDM) IN SECOND TRIMESTER: ICD-10-CM

## 2022-05-11 PROCEDURE — G0108 DIAB MANAGE TRN  PER INDIV: HCPCS | Mod: 95

## 2022-05-11 NOTE — LETTER
5/11/2022         RE: Di Kennedy  [address] [place] [address]        Dear Colleague,    Thank you for referring your patient, Di Kennedy, to the [facility] DIABETES EDUCATION [place]. Please see a copy of my visit note below.    Diabetes Self-Management Education & Support  Telephone visit: 10a-10:40a    SUBJECTIVE/OBJECTIVE:  Presents for education related to gestational diabetes.    Accompanied by: Self  Diabetes management related comments/concerns: Has been pregnant before, but has not had GDM  Gestational weeks: 28 weeks  [place] [facility] [place]  Next OB Visit Date: 06/03/22  Number of previous pregnancies: 2  Had any babies over 9 lbs: Yes  Previously had Gestational Diabetes: No  Have you ever had thyroid problems or taken thyroid medication?: No  Heart disease, mitral valve prolapse or rheumatic fever?: No  Hypertension : No  High Cholesterol: No  High Triglycerides: No  Do you use tobacco products?: No  Do you drink beer, wine or hard liquor?: No    Cultural Influences/Ethnic Background:  Choose not to answer      Estimated Date of Delivery: Aug 4, 2022    1 hour OGTT  Lab Results   Component Value Date    GLU1 195 (H) 04/29/2022         3 hour OGTT    Fasting  Lab Results   Component Value Date    GTTGF 86 05/03/2022       1 hour  Lab Results   Component Value Date    GTTG1 183 (H) 05/03/2022       2 hour  Lab Results   Component Value Date    GTTG2 166 (H) 05/03/2022       3 hour  Lab Results   Component Value Date    GTTG3 157 (H) 05/03/2022       Lifestyle and Health Behaviors:  Pre-pregnancy weight (lbs): 150  Exercise:: Yes  Days per week of moderate to strenuous exercise (like a brisk walk): 4  On average, minutes per day of exercise at this level: 40  How intense was your typical exercise? : Light (like stretching or slow walking)  Exercise Minutes per Week: 160  How many times a week on average do you eat food made away from home  (restaurant/take-out)?: 3 (fast food, panda)  Meals include: Breakfast, Lunch, Morning Snack, Dinner  Breakfast: Oatmeal with sugar with 2 slices of toast and milk  Lunch: May eat out or leftovers from the night before  Dinner: pasta meals, chicken and cheese with broccoli rice, chili dogs with cheese  Snacks: fruit  Beverages: Water, Coffee, Juice, Milk  Pre- vitamin?: Yes  Supplements?: Yes  List supplements currently taking: Vitamin D, stool softener  Experiencing nausea?: No  Experiencing heartburn?: No    Healthy Coping:  Emotional response to diabetes: Ready to learn  Stage of change: PREPARATION (Decided to change - considering how)    Current Management:  Diet   Has started testing B mg/dL before meals  105 mg/dL-130 mg/dL after a meal        ASSESSMENT:  Telephone visit with Di today for new Dx GDM.  Di was able to  her meter and has started testing.  BG reported are within range.  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  Has been walking at least 4 days per week.  She has been making healthy changes to her diet including avoiding sweets such as ice cream.   She does not drink soda and has been cutting down on coffee, milk and juice intake.  We reviewed usual dietary intake and recommendations to help manage BG.  Di is comfortable testing BG, reviewed goals and ordered ketone strips for testing urine.    INTERVENTION:  Patient has been using BG meter and was able to provide an accurate return demonstration.     Educational topics covered today:  GDM diagnosis, pathophysiology, Risks and Complications of GDM, Means of controlling GDM, Using a Blood Glucose Monitor, Blood Glucose Goals, Logging and Interpreting Glucose Results, Ketone Testing, When to Call a Diabetes Educator or OB Provider, Healthy Eating During Pregnancy, Counting Carbohydrates, Meal  Planning for GDM, and Physical Activity    Educational materials provided today:   Debbie Understanding Gestational Diabetes  GDM Log Book  Sharps Disposal  Care After Delivery    Pt verbalized understanding of concepts discussed and recommendations provided today.     PLAN:  Check glucose 4 times daily, before breakfast and 1 hour after each meal.     Check Ketones daily for one week, if negative, reduce testing to once a week.     Physical activity recommended: 30 minutes, most days of the week.    Meal plan: 2-3 carbs (30-45 grams) at breakfast, 3-4 carbs (45-60 grams) at lunch, 3-4 carbs (45-60 grams) at supper, 1-2 carbs (15-30 grams) at 3 snacks a day.  Follow consistent CHO meal plan, eat CHO and protein/fat at all meals/snacks.    Call/e-mail/Crucellhart message diabetes educator if 3 or more blood sugars are above the goal in 1 week, if ketones are positive, or with questions/concerns.    Brittney Chavez RDN, LD  Outpatient Diabetes Education  Adult Diabetes Education Triage [phone]    Time Spent: 40 minutes  Encounter Type: Individual    Any diabetes medication dose changes were made via the CDE Protocol and Collaborative Practice Agreement with the patient's OB/GYN provider. A copy of this encounter was shared with the provider.

## 2022-05-11 NOTE — PROGRESS NOTES
Diabetes Self-Management Education & Support  Telephone visit: 10a-10:40a    SUBJECTIVE/OBJECTIVE:  Presents for education related to gestational diabetes.    Accompanied by: Self  Diabetes management related comments/concerns: Has been pregnant before, but has not had GDM  Gestational weeks: 28 weeks  Hospital planned for delivery: [facility] OB Visit Date: 06/03/22  Number of previous pregnancies: 2  Had any babies over 9 lbs: Yes  Previously had Gestational Diabetes: No  Have you ever had thyroid problems or taken thyroid medication?: No  Heart disease, mitral valve prolapse or rheumatic fever?: No  Hypertension : No  High Cholesterol: No  High Triglycerides: No  Do you use tobacco products?: No  Do you drink beer, wine or hard liquor?: No    Cultural Influences/Ethnic Background:  Choose not to answer      Estimated Date of Delivery: Aug 4, 2022    1 hour OGTT  Lab Results   Component Value Date    GLU1 195 (H) 04/29/2022         3 hour OGTT    Fasting  Lab Results   Component Value Date    GTTGF 86 05/03/2022       1 hour  Lab Results   Component Value Date    GTTG1 183 (H) 05/03/2022       2 hour  Lab Results   Component Value Date    GTTG2 166 (H) 05/03/2022       3 hour  Lab Results   Component Value Date    GTTG3 157 (H) 05/03/2022       Lifestyle and Health Behaviors:  Pre-pregnancy weight (lbs): 150  Exercise:: Yes  Days per week of moderate to strenuous exercise (like a brisk walk): 4  On average, minutes per day of exercise at this level: 40  How intense was your typical exercise? : Light (like stretching or slow walking)  Exercise Minutes per Week: 160  How many times a week on average do you eat food made away from home (restaurant/take-out)?: 3 (fast food, panda)  Meals include: Breakfast, Lunch, Morning Snack, Dinner  Breakfast: Oatmeal with sugar with 2 slices of toast and milk  Lunch: May eat out or leftovers from the night before  Dinner: pasta meals, chicken and cheese with  broccoli rice, chili dogs with cheese  Snacks: fruit  Beverages: Water, Coffee, Juice, Milk  Pre- vitamin?: Yes  Supplements?: Yes  List supplements currently taking: Vitamin D, stool softener  Experiencing nausea?: No  Experiencing heartburn?: No    Healthy Coping:  Emotional response to diabetes: Ready to learn  Stage of change: PREPARATION (Decided to change - considering how)    Current Management:  Diet   Has started testing B mg/dL before meals  105 mg/dL-130 mg/dL after a meal        ASSESSMENT:  Telephone visit with Di today for new Dx GDM.  Di was able to  her meter and has started testing.  BG reported are within range.  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  Has been walking at least 4 days per week.  She has been making healthy changes to her diet including avoiding sweets such as ice cream.   She does not drink soda and has been cutting down on coffee, milk and juice intake.  We reviewed usual dietary intake and recommendations to help manage BG.  Di is comfortable testing BG, reviewed goals and ordered ketone strips for testing urine.    INTERVENTION:  Patient has been using BG meter and was able to provide an accurate return demonstration.     Educational topics covered today:  GDM diagnosis, pathophysiology, Risks and Complications of GDM, Means of controlling GDM, Using a Blood Glucose Monitor, Blood Glucose Goals, Logging and Interpreting Glucose Results, Ketone Testing, When to Call a Diabetes Educator or OB Provider, Healthy Eating During Pregnancy, Counting Carbohydrates, Meal Planning for GDM, and Physical Activity    Educational materials provided today:   Debbie Understanding Gestational Diabetes  GDM Log Book  Sharps Disposal  Care After Delivery    Pt verbalized understanding of concepts discussed and recommendations provided today.      PLAN:  Check glucose 4 times daily, before breakfast and 1 hour after each meal.     Check Ketones daily for one week, if negative, reduce testing to once a week.     Physical activity recommended: 30 minutes, most days of the week.    Meal plan: 2-3 carbs (30-45 grams) at breakfast, 3-4 carbs (45-60 grams) at lunch, 3-4 carbs (45-60 grams) at supper, 1-2 carbs (15-30 grams) at 3 snacks a day.  Follow consistent CHO meal plan, eat CHO and protein/fat at all meals/snacks.    Call/e-mail/Gourm[employer]art message diabetes educator if 3 or more blood sugars are above the goal in 1 week, if ketones are positive, or with questions/concerns.    Brittney Chavez RDN, LD  Outpatient Diabetes Education  Adult Diabetes Education Triage [phone]    Time Spent: 40 minutes  Encounter Type: Individual    Any diabetes medication dose changes were made via the CDE Protocol and Collaborative Practice Agreement with the patient's OB/GYN provider. A copy of this encounter was shared with the provider.

## 2022-05-11 NOTE — PATIENT INSTRUCTIONS
Check glucose 4 times daily, before breakfast and 1 hour after each meal.     Check Ketones daily for one week, if negative, reduce testing to once a week.     Physical activity recommended: 30 minutes, most days of the week.    Meal plan: 2-3 carbs (30-45 grams) at breakfast, 3-4 carbs (45-60 grams) at lunch, 3-4 carbs (45-60 grams) at supper, 1-2 carbs (15-30 grams) at 3 snacks a day.  Follow consistent CHO meal plan, eat CHO and protein/fat at all meals/snacks.    Call/e-mail/[employer] message diabetes educator if 3 or more blood sugars are above the goal in 1 week, if ketones are positive, or with questions/concerns.

## 2022-05-31 ENCOUNTER — VIRTUAL VISIT (OUTPATIENT)
Dept: EDUCATION SERVICES | Facility: CLINIC | Age: 32
End: 2022-05-31
Payer: COMMERCIAL

## 2022-05-31 DIAGNOSIS — O24.419 GESTATIONAL DIABETES MELLITUS: Primary | ICD-10-CM

## 2022-05-31 PROCEDURE — 98966 PH1 ASSMT&MGMT NQHP 5-10: CPT

## 2022-05-31 NOTE — LETTER
5/31/2022         RE: Di eKnnedy  [address] [place] [address]        Dear Colleague,    Thank you for referring your patient, Di Kennedy, to the [facility] DIABETES EDUCATION [place]. Please see a copy of my visit note below.    Diabetes Self-Management Education & Support  Telephone visit: 10a-10:10a    SUBJECTIVE/OBJECTIVE:  Presents for education related to gestational diabetes.    Accompanied by: Self  Diabetes management related comments/concerns: Has been pregnant before, but has not had GDM  Gestational weeks: 30 weeks  Next OB Visit Date: 06/03/22  Number of previous pregnancies: 2  Had any babies over 9 lbs: Yes  Previously had Gestational Diabetes: No  Have you ever had thyroid problems or taken thyroid medication?: No  Heart disease, mitral valve prolapse or rheumatic fever?: No  Hypertension : No  High Cholesterol: No  High Triglycerides: No  Do you use tobacco products?: No  Do you drink beer, wine or hard liquor?: No    Cultural Influences/Ethnic Background:  Choose not to answer      LMP 10/28/2021         Estimated Date of Delivery: Aug 4, 2022    Blood Glucose/Ketone Log:     Date  Ketones FBG 1 hours post Breakfast 1 hour post lunch    1 hours post dinner   5/31  76 143       Has had one BG above 140 after meal, thinks due to sweet tea.    No reported fasting BG above 95    Lifestyle and Health Behaviors:  Pre-pregnancy weight (lbs): 150  Exercise:: Yes  Days per week of moderate to strenuous exercise (like a brisk walk): 4  On average, minutes per day of exercise at this level: 40  How intense was your typical exercise? : Light (like stretching or slow walking)  Exercise Minutes per Week: 160  How many times a week on average do you eat food made away from home (restaurant/take-out)?: 3 (fast food, panda)  Meals include: Breakfast, Lunch, Morning Snack, Dinner  Breakfast: 1/2 bagel with peanut butter and fruit  Lunch: May eat out or leftovers from the night  before  Dinner: pasta meals, chicken and cheese with broccoli rice, chili dogs with cheese more grilling now in summer- just meat without the bun/bread  Snacks: fruit  Beverages: Water, Coffee, Milk  Pre- vitamin?: Yes  Supplements?: Yes  List supplements currently taking: Vitamin D, stool softener  Experiencing nausea?: No  Experiencing heartburn?: No    Healthy Coping:  Emotional response to diabetes: Ready to learn  Stage of change: ACTION (Actively working towards change)    Current Management:  Taking medications for gestational diabetes?: No  Difficulty affording diabetes medication?: No  Difficulty affording diabetes testing supplies?: No    ASSESSMENT:  Ketones: Has not tested yet.   Fasting blood glucoses: 100% in target.    Telephone visit with Di today to review management of GDM.  She has been testing BG, forgot her log book today and is at work.  Will upload BG log to my chart later today when able.  She reports she has been eating smaller breakfasts and that is working well.  Continues to walk after meals and around [place] at work    Has not been testing Ketones.  Recommended  strips and begin testing first morning urine.  Di has no questions or concerns today.    INTERVENTION:  Educational topics covered today:  What to expect after delivery, Future testing for Type 2 diabetes (2 hour OGTT at 6 week post-partum check-up and annual fasting blood glucose level), Risk of GDM and planning ahead for future pregnancies, Recommended lifestyle interventions for reducing the risk of Type 2 Diabetes, When to Call a Diabetes Educator or OB Provider    Educational Materials provided today:  Debbie Preventing Diabetes    PLAN:  Check glucose 4 times daily.  Check ketones once a day for 5-7 days.  If negative/trace, may move to weekly testing.  Continue with recommended physical activity.  Continue to follow recommended meal plan: 2-3 carbs at breakfast, 3-4 carbs at lunch, 3-4 carbs at supper,  1-2 carbs at snacks.  Follow consistent CHO meal plan, eat CHO and protein/fat at all meals/snacks.    Call/e-mail/MyChart message diabetes educator if 3 or more blood sugars are above the goal in 1 week or if ketones are positive.    Brittney Chavez RDN, LD  Outpatient Diabetes Education  Adult Diabetes Education Triage [phone]    Time Spent: 10 minutes  Encounter Type: Individual    Any diabetes medication dose changes were made via the CDE Protocol and Collaborative Practice Agreement with the patient's OB/GYN provider. A copy of this encounter was shared with the provider.         Wartpeel Counseling:  I discussed with the patient the risks of Wartpeel including but not limited to erythema, scaling, itching, weeping, crusting, and pain.

## 2022-05-31 NOTE — PROGRESS NOTES
Diabetes Self-Management Education & Support  Telephone visit: 10a-10:10a    SUBJECTIVE/OBJECTIVE:  Presents for education related to gestational diabetes.    Accompanied by: Self  Diabetes management related comments/concerns: Has been pregnant before, but has not had GDM  Gestational weeks: 30 weeks  Next OB Visit Date: 22  Number of previous pregnancies: 2  Had any babies over 9 lbs: Yes  Previously had Gestational Diabetes: No  Have you ever had thyroid problems or taken thyroid medication?: No  Heart disease, mitral valve prolapse or rheumatic fever?: No  Hypertension : No  High Cholesterol: No  High Triglycerides: No  Do you use tobacco products?: No  Do you drink beer, wine or hard liquor?: No    Cultural Influences/Ethnic Background:  Choose not to answer      LMP 10/28/2021         Estimated Date of Delivery: Aug 4, 2022    Blood Glucose/Ketone Log:     Date  Ketones FBG 1 hours post Breakfast 1 hour post lunch    1 hours post dinner     76 143       Has had one BG above 140 after meal, thinks due to sweet tea.    No reported fasting BG above 95    Lifestyle and Health Behaviors:  Pre-pregnancy weight (lbs): 150  Exercise:: Yes  Days per week of moderate to strenuous exercise (like a brisk walk): 4  On average, minutes per day of exercise at this level: 40  How intense was your typical exercise? : Light (like stretching or slow walking)  Exercise Minutes per Week: 160  How many times a week on average do you eat food made away from home (restaurant/take-out)?: 3 (fast food, panda)  Meals include: Breakfast, Lunch, Morning Snack, Dinner  Breakfast: 1/2 bagel with peanut butter and fruit  Lunch: May eat out or leftovers from the night before  Dinner: pasta meals, chicken and cheese with broccoli rice, chili dogs with cheese more grilling now in summer- just meat without the bun/bread  Snacks: fruit  Beverages: Water, Coffee, Milk  Pre- vitamin?: Yes  Supplements?: Yes  List supplements  currently taking: Vitamin D, stool softener  Experiencing nausea?: No  Experiencing heartburn?: No    Healthy Coping:  Emotional response to diabetes: Ready to learn  Stage of change: ACTION (Actively working towards change)    Current Management:  Taking medications for gestational diabetes?: No  Difficulty affording diabetes medication?: No  Difficulty affording diabetes testing supplies?: No    ASSESSMENT:  Ketones: Has not tested yet.   Fasting blood glucoses: 100% in target.    Telephone visit with Di today to review management of GDM.  She has been testing BG, forgot her log book today and is at work.  Will upload BG log to my chart later today when able.  She reports she has been eating smaller breakfasts and that is working well.  Continues to walk after meals and around [place] at work    Has not been testing Ketones.  Recommended  strips and begin testing first morning urine.  Di has no questions or concerns today.    INTERVENTION:  Educational topics covered today:  What to expect after delivery, Future testing for Type 2 diabetes (2 hour OGTT at 6 week post-partum check-up and annual fasting blood glucose level), Risk of GDM and planning ahead for future pregnancies, Recommended lifestyle interventions for reducing the risk of Type 2 Diabetes, When to Call a Diabetes Educator or OB Provider    Educational Materials provided today:  [place] Preventing Diabetes    PLAN:  Check glucose 4 times daily.  Check ketones once a day for 5-7 days.  If negative/trace, may move to weekly testing.  Continue with recommended physical activity.  Continue to follow recommended meal plan: 2-3 carbs at breakfast, 3-4 carbs at lunch, 3-4 carbs at supper, 1-2 carbs at snacks.  Follow consistent CHO meal plan, eat CHO and protein/fat at all meals/snacks.    Call/e-mail/Mtimehart message diabetes educator if 3 or more blood sugars are above the goal in 1 week or if ketones are positive.    Brittney Chavez RDN,  RACHELLE  Outpatient Diabetes Education  Adult Diabetes Education Triage [phone]    Time Spent: 10 minutes  Encounter Type: Individual    Any diabetes medication dose changes were made via the CDE Protocol and Collaborative Practice Agreement with the patient's OB/GYN provider. A copy of this encounter was shared with the provider.

## 2022-05-31 NOTE — PATIENT INSTRUCTIONS
Check glucose 4 times daily.  Check ketones once a day for 5-7 days.  If negative/trace, may move to weekly testing.  Continue with recommended physical activity.  Continue to follow recommended meal plan: 2-3 carbs at breakfast, 3-4 carbs at lunch, 3-4 carbs at supper, 1-2 carbs at snacks.  Follow consistent CHO meal plan, eat CHO and protein/fat at all meals/snacks.     Call/e-mail/MyChart message diabetes educator if 3 or more blood sugars are above the goal in 1 week or if ketones are positive.     Brittney Chavez RDN, LD  Outpatient Diabetes Education  Adult Diabetes Education Triage [phone]

## 2022-06-03 ENCOUNTER — OFFICE VISIT (OUTPATIENT)
Dept: OBGYN | Facility: CLINIC | Age: 32
End: 2022-06-03
Attending: REGISTERED NURSE
Payer: COMMERCIAL

## 2022-06-03 ENCOUNTER — ANCILLARY PROCEDURE (OUTPATIENT)
Dept: ULTRASOUND IMAGING | Facility: CLINIC | Age: 32
End: 2022-06-03
Attending: REGISTERED NURSE
Payer: COMMERCIAL

## 2022-06-03 ENCOUNTER — HOSPITAL ENCOUNTER (OUTPATIENT)
Facility: CLINIC | Age: 32
End: 2022-06-03
Admitting: ADVANCED PRACTICE MIDWIFE
Payer: COMMERCIAL

## 2022-06-03 ENCOUNTER — HOSPITAL ENCOUNTER (OUTPATIENT)
Facility: CLINIC | Age: 32
Discharge: HOME | End: 2022-06-03
Attending: ADVANCED PRACTICE MIDWIFE | Admitting: ADVANCED PRACTICE MIDWIFE
Payer: COMMERCIAL

## 2022-06-03 VITALS
BODY MASS INDEX: 28.82 KG/M2 | DIASTOLIC BLOOD PRESSURE: 59 MMHG | RESPIRATION RATE: 16 BRPM | SYSTOLIC BLOOD PRESSURE: 113 MMHG | TEMPERATURE: 97.9 F | WEIGHT: 173 LBS | HEIGHT: 65 IN

## 2022-06-03 VITALS
HEART RATE: 76 BPM | SYSTOLIC BLOOD PRESSURE: 102 MMHG | WEIGHT: 173 LBS | BODY MASS INDEX: 28.79 KG/M2 | DIASTOLIC BLOOD PRESSURE: 67 MMHG

## 2022-06-03 DIAGNOSIS — O43.129 VELAMENTOUS INSERTION OF UMBILICAL CORD, ANTEPARTUM: ICD-10-CM

## 2022-06-03 DIAGNOSIS — Z34.80 NORMAL PREGNANCY IN MULTIGRAVIDA: ICD-10-CM

## 2022-06-03 DIAGNOSIS — Z34.80 NORMAL PREGNANCY IN MULTIGRAVIDA: Primary | ICD-10-CM

## 2022-06-03 PROBLEM — Z36.89 ENCOUNTER FOR TRIAGE IN PREGNANT PATIENT: Status: ACTIVE | Noted: 2022-06-03

## 2022-06-03 LAB — RUPTURE OF FETAL MEMBRANES BY ROM PLUS: NEGATIVE

## 2022-06-03 PROCEDURE — G0463 HOSPITAL OUTPT CLINIC VISIT: HCPCS

## 2022-06-03 PROCEDURE — 99207 PR PRENATAL VISIT: CPT | Performed by: REGISTERED NURSE

## 2022-06-03 PROCEDURE — 76816 OB US FOLLOW-UP PER FETUS: CPT | Mod: 26 | Performed by: OBSTETRICS & GYNECOLOGY

## 2022-06-03 PROCEDURE — 84112 EVAL AMNIOTIC FLUID PROTEIN: CPT | Performed by: ADVANCED PRACTICE MIDWIFE

## 2022-06-03 PROCEDURE — 76816 OB US FOLLOW-UP PER FETUS: CPT

## 2022-06-03 RX ORDER — LANCETS 33 GAUGE
EACH MISCELLANEOUS
COMMUNITY
Start: 2022-05-04 | End: 2022-11-03

## 2022-06-03 RX ORDER — DOCUSATE SODIUM 100 MG/1
CAPSULE, LIQUID FILLED ORAL
COMMUNITY
Start: 2022-04-29 | End: 2022-11-03

## 2022-06-03 RX ORDER — LIDOCAINE 40 MG/G
CREAM TOPICAL
Status: DISCONTINUED | OUTPATIENT
Start: 2022-06-03 | End: 2022-06-03 | Stop reason: HOSPADM

## 2022-06-03 RX ORDER — URINE ACETONE TEST STRIPS
STRIP MISCELLANEOUS
COMMUNITY
Start: 2022-05-11 | End: 2022-11-03

## 2022-06-03 ASSESSMENT — PAIN SCALES - GENERAL: PAINLEVEL: NO PAIN (0)

## 2022-06-03 NOTE — DISCHARGE INSTRUCTIONS
Discharge Instruction for Undelivered Patients      You were seen for: Membrane Assessment  We Consulted: Dhara Bojorquez CNM   You had (Test or Medicine):ROM+, fetal monitoring     Diet:   Drink 8 to 12 glasses of liquids (milk, juice, water) every day.  You may eat meals and snacks.     Activity:  Count fetal kicks everyday (see handout)  Call your doctor or nurse midwife if your baby is moving less than usual.     Call your provider if you notice:  Swelling in your face or increased swelling in your hands or legs.  Headaches that are not relieved by Tylenol (acetaminophen).  Changes in your vision (blurring: seeing spots or stars.)  Nausea (sick to your stomach) and vomiting (throwing up).   Weight gain of 5 pounds or more per week.  Heartburn that doesn't go away.  Signs of bladder infection: pain when you urinate (use the toilet), need to go more often and more urgently.  The bag of hoffman (rupture of membranes) breaks, or you notice leaking in your underwear.  Bright red blood in your underwear.  Abdominal (lower belly) or stomach pain.  Second (plus) baby: Contractions (tightening) less than 10 minutes apart and getting stronger.  *If less than 34 weeks: Contractions (tightening) more than 6 times in one hour.  Increase or change in vaginal discharge (note the color and amount)    Follow-up:  As scheduled in the clinic

## 2022-06-03 NOTE — NURSING NOTE
Chief Complaint   Patient presents with     Prenatal Care     31.1     Has been having on and off leakage of clear fluid for the last week. Some cramping with leaking.

## 2022-06-03 NOTE — PROGRESS NOTES
"Subjective:      32 year old  at 31w1d presentst for a routine prenatal appointment.    no vaginal bleeding. Endorses LOF. Intermittent cramping that lasts 10 minutes, resolves spontaneously. Occ. BH contractions. Reports good fetal movement.       No HA, visual changes, RUQ or epigastric pain.   Feeling well overall.  Reviewed EOB labs with patient.  Reviewed TDAP: due today.     Reviewed growth US today: growth 79%ile (4lbs 6oz), , cephalic, KENY nl    Patient concerns:   Notes that when she ws getting out of bed and noticed fluid leak down leg, fluid was clear in color, this was 2 weeks ago. Second time it happened was walking and felt underwear become wet and some fluid leaked down her leg a few days later. Went into the bathroom and noted the fluid was clear \"like water\". She is sure it is not urine as it was odorless and when she went to the bathroom both times she did not need to void. Not currently leaking any fluid.     A1GDM  Had DM education visits x2, glucose logs reviewed with good control    GDM:   Current therapy:   Nutritional Therapy    She is checking her blood sugars regularly, including Fasting and 1-hour post-prandial.  Did not bring her log today. She reports they are mostly in range. 3-4 values out of range in the last 2 weeks, usually the post-meal. Only off by \"6-7 points\".     Diabetes Symptoms:   none    Objective:  Vitals:    22 1402   BP: 102/67   Pulse: 76   Weight: 78.5 kg (173 lb)   , see ob flowsheet  Assessment/Plan     Encounter Diagnoses   Name Primary?     Normal pregnancy in multigravida Yes     Velamentous insertion of umbilical cord, antepartum      No orders of the defined types were placed in this encounter.    Orders Placed This Encounter   Medications     KETOSTIX test strip     docusate sodium (COLACE) 100 MG capsule     Sig: TAKE 1 CAPSULE BY MOUTH TWICE A DAY     Lancets (ONETOUCH DELICA PLUS [id number]) MISC     Sig: USE TO TEST BLOOD SUGAR 4 TIMES " DAILY OR AS DIRECTED.     Antibody Screen   Date Value Ref Range Status   01/04/2022 Negative Negative Final   , Rhogam  was not given. Not indicated.     - Discussed presenting to triage for r/o rupture of membranes. She has to get her older child off of the school bus, but is willing to present around 4pm.   - If not ruptured plan follow up in clinic 2 weeks for ANNA Frank CNM

## 2022-06-03 NOTE — PLAN OF CARE
Data: [facility] at 1558.   Reason for maternal/fetal assessment per patient is Rule out rupture of membranes  . Patient is a . Prenatal record reviewed.      OB History    Para Term  AB Living   4 2 2 0 1 2   SAB IAB Ectopic Multiple Live Births   0 1 0 0 2      # Outcome Date GA Lbr Dell/2nd Weight Sex Delivery Anes PTL Lv   4 Current            3 Term 17 37w1d 02:18 / 00:10 3.43 kg (7 lb 9 oz) F Vag-Spont None N SAILAJA      Name: Lynda      Apgar1: 9  Apgar5: 9   2 Term 05/29/15 39w0d  4.082 kg (9 lb) F Vag-Spont EPI N SAILAJA      Name: Ngoc Pineda   1 IAB 14 5w0d             Birth Comments: Medical termination, no complications.       Obstetric Comments   No GDM, no HTN, no shoulder dystocia no PPH, no PPMD.      Medical History:   Past Medical History:   Diagnosis Date     Known health problems: none    . Gestational Age 31w1d. VSS. Cervix: not examined.  Fetal movement present. Patient denies cramping, backache, vaginal discharge, pelvic pressure, UTI symptoms, GI problems, bloody show, vaginal bleeding, edema, headache, visual disturbances, epigastric or URQ pain, abdominal pain, rupture of membranes.  Complains of intermittent leaking clear fluid and cramping for the last week.  Action: Verbal consent for EFM. Triage assessment completed. EFM applied. Uterine assessment per toco. Fetal assessment: Presumed adequate fetal oxygenation documented (see flow record). Patient education pamphlets given on fetal movement counts and labor precautions. Patient instructed to report change in fetal movement, vaginal leaking of fluid or bleeding, abdominal pain, or any concerns related to the pregnancy to her nurse/physician.   Response: A Page CNM informed of patient arrival, complaints, and negative ROM+. Plan per provider is discharge to home. Patient verbalized understanding of education and verbalized agreement with plan. Discharged ambulatory at  1722.

## 2022-06-03 NOTE — LETTER
"6/3/2022       RE: Di Kennedy  [address] [place] [address]     Dear Colleague,    Thank you for referring your patient, Di Kennedy, to the [facility] [place] at [facility]. Please see a copy of my visit note below.    Subjective:      32 year old  at 31w1d presentst for a routine prenatal appointment.    no vaginal bleeding. Endorses LOF. Intermittent cramping that lasts 10 minutes, resolves spontaneously. Occ. BH contractions. Reports good fetal movement.       No HA, visual changes, RUQ or epigastric pain.   Feeling well overall.  Reviewed EOB labs with patient.  Reviewed TDAP: due today.     Reviewed growth US today: growth 79%ile (4lbs 6oz), , cephalic, KENY nl    Patient concerns:   Notes that when she ws getting out of bed and noticed fluid leak down leg, fluid was clear in color, this was 2 weeks ago. Second time it happened was walking and felt underwear become wet and some fluid leaked down her leg a few days later. Went into the bathroom and noted the fluid was clear \"like water\". She is sure it is not urine as it was odorless and when she went to the bathroom both times she did not need to void. Not currently leaking any fluid.     A1GDM  Had DM education visits x2, glucose logs reviewed with good control    GDM:   Current therapy:   Nutritional Therapy    She is checking her blood sugars regularly, including Fasting and 1-hour post-prandial.  Did not bring her log today. She reports they are mostly in range. 3-4 values out of range in the last 2 weeks, usually the post-meal. Only off by \"6-7 points\".     Diabetes Symptoms:   none    Objective:  Vitals:    22 1402   BP: 102/67   Pulse: 76   Weight: 78.5 kg (173 lb)   , see ob flowsheet  Assessment/Plan     Encounter Diagnoses   Name Primary?     Normal pregnancy in multigravida Yes     Velamentous insertion of umbilical cord, antepartum      No " orders of the defined types were placed in this encounter.    Orders Placed This Encounter   Medications     KETOSTIX test strip     docusate sodium (COLACE) 100 MG capsule     Sig: TAKE 1 CAPSULE BY MOUTH TWICE A DAY     Lancets (ONETOUCH DELICA PLUS [id number]) MISC     Sig: USE TO TEST BLOOD SUGAR 4 TIMES DAILY OR AS DIRECTED.     Antibody Screen   Date Value Ref Range Status   01/04/2022 Negative Negative Final   , Rhogam  was not given. Not indicated.     - Discussed presenting to triage for r/o rupture of membranes. She has to get her older child off of the school bus, but is willing to present around 4pm.   - If not ruptured plan follow up in clinic 2 weeks for ANNA Frank CNM

## 2022-06-06 PROBLEM — O24.410 GDM (GESTATIONAL DIABETES MELLITUS), CLASS A1: Status: ACTIVE | Noted: 2022-04-29

## 2022-06-17 ENCOUNTER — TELEPHONE (OUTPATIENT)
Dept: OBGYN | Facility: CLINIC | Age: 32
End: 2022-06-17

## 2022-06-17 ENCOUNTER — HOSPITAL ENCOUNTER (INPATIENT)
Facility: CLINIC | Age: 32
LOS: 1 days | Discharge: HOME | End: 2022-06-18
Attending: ADVANCED PRACTICE MIDWIFE | Admitting: OBSTETRICS & GYNECOLOGY
Payer: COMMERCIAL

## 2022-06-17 DIAGNOSIS — M25.552 HIP PAIN, BILATERAL: ICD-10-CM

## 2022-06-17 DIAGNOSIS — M25.551 HIP PAIN, BILATERAL: ICD-10-CM

## 2022-06-17 DIAGNOSIS — O42.919 PRETERM PREMATURE RUPTURE OF MEMBRANES (PPROM) WITH UNKNOWN ONSET OF LABOR: ICD-10-CM

## 2022-06-17 LAB
ABO/RH(D): NORMAL
ALBUMIN UR-MCNC: 30 MG/DL
AMORPH CRY #/AREA URNS HPF: ABNORMAL /HPF
ANTIBODY SCREEN: NEGATIVE
APPEARANCE UR: CLEAR
BACTERIA #/AREA URNS HPF: ABNORMAL /HPF
BASOPHILS # BLD AUTO: 0 10E3/UL (ref 0–0.2)
BASOPHILS NFR BLD AUTO: 0 %
BILIRUB UR QL STRIP: NEGATIVE
BLD PROD TYP BPU: NORMAL
BLOOD COMPONENT TYPE: NORMAL
CLUE CELLS: ABNORMAL
CODING SYSTEM: NORMAL
COLOR UR AUTO: ABNORMAL
CROSSMATCH: NORMAL
CRYSTALS AMN MICRO: ABNORMAL
EOSINOPHIL # BLD AUTO: 0.3 10E3/UL (ref 0–0.7)
EOSINOPHIL NFR BLD AUTO: 3 %
ERYTHROCYTE [DISTWIDTH] IN BLOOD BY AUTOMATED COUNT: 12.7 % (ref 10–15)
ERYTHROCYTE [DISTWIDTH] IN BLOOD BY AUTOMATED COUNT: 12.7 % (ref 10–15)
GLUCOSE BLDC GLUCOMTR-MCNC: 100 MG/DL (ref 70–99)
GLUCOSE BLDC GLUCOMTR-MCNC: 79 MG/DL (ref 70–99)
GLUCOSE UR STRIP-MCNC: 100 MG/DL
HCT VFR BLD AUTO: 22.7 % (ref 35–47)
HCT VFR BLD AUTO: 33.7 % (ref 35–47)
HGB BLD-MCNC: 11.3 G/DL (ref 11.7–15.7)
HGB BLD-MCNC: 7.5 G/DL (ref 11.7–15.7)
HGB UR QL STRIP: ABNORMAL
HOLD SPECIMEN: NORMAL
HYALINE CASTS: 3 /LPF
IMM GRANULOCYTES # BLD: 0.2 10E3/UL
IMM GRANULOCYTES NFR BLD: 1 %
KETONES UR STRIP-MCNC: NEGATIVE MG/DL
LEUKOCYTE ESTERASE UR QL STRIP: ABNORMAL
LYMPHOCYTES # BLD AUTO: 2.8 10E3/UL (ref 0.8–5.3)
LYMPHOCYTES NFR BLD AUTO: 23 %
MCH RBC QN AUTO: 29.9 PG (ref 26.5–33)
MCH RBC QN AUTO: 30.1 PG (ref 26.5–33)
MCHC RBC AUTO-ENTMCNC: 33 G/DL (ref 31.5–36.5)
MCHC RBC AUTO-ENTMCNC: 33.5 G/DL (ref 31.5–36.5)
MCV RBC AUTO: 90 FL (ref 78–100)
MCV RBC AUTO: 90 FL (ref 78–100)
MONOCYTES # BLD AUTO: 1 10E3/UL (ref 0–1.3)
MONOCYTES NFR BLD AUTO: 8 %
MUCOUS THREADS #/AREA URNS LPF: PRESENT /LPF
NEUTROPHILS # BLD AUTO: 8.2 10E3/UL (ref 1.6–8.3)
NEUTROPHILS NFR BLD AUTO: 65 %
NITRATE UR QL: NEGATIVE
NRBC # BLD AUTO: 0 10E3/UL
NRBC BLD AUTO-RTO: 0 /100
PH UR STRIP: 5.5 [PH] (ref 5–7)
PLATELET # BLD AUTO: 260 10E3/UL (ref 150–450)
PLATELET # BLD AUTO: 348 10E3/UL (ref 150–450)
RBC # BLD AUTO: 2.51 10E6/UL (ref 3.8–5.2)
RBC # BLD AUTO: 3.75 10E6/UL (ref 3.8–5.2)
RBC URINE: 16 /HPF
RUPTURE OF FETAL MEMBRANES BY ROM PLUS: POSITIVE
SARS-COV-2 RNA RESP QL NAA+PROBE: NEGATIVE
SP GR UR STRIP: 1.02 (ref 1–1.03)
SPECIMEN EXPIRATION DATE: NORMAL
SQUAMOUS EPITHELIAL: 21 /HPF
T PALLIDUM AB SER QL: NONREACTIVE
T PALLIDUM AB SER QL: NONREACTIVE
TRICHOMONAS, WET PREP: ABNORMAL
UNIT ABO/RH: NORMAL
UNIT NUMBER: NORMAL
UNIT STATUS: NORMAL
UNIT TYPE ISBT: 6200
UROBILINOGEN UR STRIP-MCNC: NORMAL MG/DL
WBC # BLD AUTO: 12.5 10E3/UL (ref 4–11)
WBC # BLD AUTO: 12.8 10E3/UL (ref 4–11)
WBC URINE: 23 /HPF
WBC'S/HIGH POWER FIELD, WET PREP: ABNORMAL
YEAST, WET PREP: ABNORMAL

## 2022-06-17 PROCEDURE — 84112 EVAL AMNIOTIC FLUID PROTEIN: CPT | Performed by: ADVANCED PRACTICE MIDWIFE

## 2022-06-17 PROCEDURE — 722N000001 HC LABOR CARE VAGINAL DELIVERY SINGLE

## 2022-06-17 PROCEDURE — 88307 TISSUE EXAM BY PATHOLOGIST: CPT | Mod: 26 | Performed by: PATHOLOGY

## 2022-06-17 PROCEDURE — 86780 TREPONEMA PALLIDUM: CPT | Performed by: STUDENT IN AN ORGANIZED HEALTH CARE EDUCATION/TRAINING PROGRAM

## 2022-06-17 PROCEDURE — 86901 BLOOD TYPING SEROLOGIC RH(D): CPT | Performed by: STUDENT IN AN ORGANIZED HEALTH CARE EDUCATION/TRAINING PROGRAM

## 2022-06-17 PROCEDURE — 87210 SMEAR WET MOUNT SALINE/INK: CPT | Performed by: ADVANCED PRACTICE MIDWIFE

## 2022-06-17 PROCEDURE — 87086 URINE CULTURE/COLONY COUNT: CPT | Performed by: STUDENT IN AN ORGANIZED HEALTH CARE EDUCATION/TRAINING PROGRAM

## 2022-06-17 PROCEDURE — C9803 HOPD COVID-19 SPEC COLLECT: HCPCS

## 2022-06-17 PROCEDURE — G0463 HOSPITAL OUTPT CLINIC VISIT: HCPCS

## 2022-06-17 PROCEDURE — 99221 1ST HOSP IP/OBS SF/LOW 40: CPT | Mod: 25 | Performed by: OBSTETRICS & GYNECOLOGY

## 2022-06-17 PROCEDURE — 59025 FETAL NON-STRESS TEST: CPT | Mod: 26 | Performed by: OBSTETRICS & GYNECOLOGY

## 2022-06-17 PROCEDURE — 99231 SBSQ HOSP IP/OBS SF/LOW 25: CPT | Performed by: NURSE PRACTITIONER

## 2022-06-17 PROCEDURE — 85025 COMPLETE CBC W/AUTO DIFF WBC: CPT | Performed by: STUDENT IN AN ORGANIZED HEALTH CARE EDUCATION/TRAINING PROGRAM

## 2022-06-17 PROCEDURE — 250N000013 HC RX MED GY IP 250 OP 250 PS 637

## 2022-06-17 PROCEDURE — 86850 RBC ANTIBODY SCREEN: CPT | Performed by: STUDENT IN AN ORGANIZED HEALTH CARE EDUCATION/TRAINING PROGRAM

## 2022-06-17 PROCEDURE — 82962 GLUCOSE BLOOD TEST: CPT

## 2022-06-17 PROCEDURE — 87491 CHLMYD TRACH DNA AMP PROBE: CPT | Performed by: ADVANCED PRACTICE MIDWIFE

## 2022-06-17 PROCEDURE — 87591 N.GONORRHOEAE DNA AMP PROB: CPT | Performed by: ADVANCED PRACTICE MIDWIFE

## 2022-06-17 PROCEDURE — 120N000002 HC R&B MED SURG/OB UMMC

## 2022-06-17 PROCEDURE — 88307 TISSUE EXAM BY PATHOLOGIST: CPT | Mod: TC

## 2022-06-17 PROCEDURE — 59409 OBSTETRICAL CARE: CPT | Mod: GC | Performed by: OBSTETRICS & GYNECOLOGY

## 2022-06-17 PROCEDURE — 258N000003 HC RX IP 258 OP 636: Performed by: STUDENT IN AN ORGANIZED HEALTH CARE EDUCATION/TRAINING PROGRAM

## 2022-06-17 PROCEDURE — U0005 INFEC AGEN DETEC AMPLI PROBE: HCPCS | Performed by: ADVANCED PRACTICE MIDWIFE

## 2022-06-17 PROCEDURE — 250N000011 HC RX IP 250 OP 636: Performed by: STUDENT IN AN ORGANIZED HEALTH CARE EDUCATION/TRAINING PROGRAM

## 2022-06-17 PROCEDURE — 85027 COMPLETE CBC AUTOMATED: CPT | Performed by: STUDENT IN AN ORGANIZED HEALTH CARE EDUCATION/TRAINING PROGRAM

## 2022-06-17 PROCEDURE — 81001 URINALYSIS AUTO W/SCOPE: CPT | Performed by: STUDENT IN AN ORGANIZED HEALTH CARE EDUCATION/TRAINING PROGRAM

## 2022-06-17 PROCEDURE — 250N000013 HC RX MED GY IP 250 OP 250 PS 637: Performed by: STUDENT IN AN ORGANIZED HEALTH CARE EDUCATION/TRAINING PROGRAM

## 2022-06-17 PROCEDURE — 86923 COMPATIBILITY TEST ELECTRIC: CPT | Performed by: STUDENT IN AN ORGANIZED HEALTH CARE EDUCATION/TRAINING PROGRAM

## 2022-06-17 PROCEDURE — 36415 COLL VENOUS BLD VENIPUNCTURE: CPT | Performed by: STUDENT IN AN ORGANIZED HEALTH CARE EDUCATION/TRAINING PROGRAM

## 2022-06-17 PROCEDURE — 87653 STREP B DNA AMP PROBE: CPT | Performed by: ADVANCED PRACTICE MIDWIFE

## 2022-06-17 RX ORDER — OXYTOCIN 10 [USP'U]/ML
10 INJECTION, SOLUTION INTRAMUSCULAR; INTRAVENOUS
Status: DISCONTINUED | OUTPATIENT
Start: 2022-06-17 | End: 2022-06-17 | Stop reason: HOSPADM

## 2022-06-17 RX ORDER — LIDOCAINE HYDROCHLORIDE 10 MG/ML
INJECTION, SOLUTION EPIDURAL; INFILTRATION; INTRACAUDAL; PERINEURAL
Status: DISCONTINUED
Start: 2022-06-17 | End: 2022-06-17 | Stop reason: WASHOUT

## 2022-06-17 RX ORDER — METHYLERGONOVINE MALEATE 0.2 MG/ML
200 INJECTION INTRAVENOUS
Status: DISCONTINUED | OUTPATIENT
Start: 2022-06-17 | End: 2022-06-18 | Stop reason: HOSPADM

## 2022-06-17 RX ORDER — SODIUM CHLORIDE, SODIUM LACTATE, POTASSIUM CHLORIDE, CALCIUM CHLORIDE 600; 310; 30; 20 MG/100ML; MG/100ML; MG/100ML; MG/100ML
INJECTION, SOLUTION INTRAVENOUS CONTINUOUS
Status: DISCONTINUED | OUTPATIENT
Start: 2022-06-17 | End: 2022-06-17 | Stop reason: HOSPADM

## 2022-06-17 RX ORDER — NALOXONE HYDROCHLORIDE 0.4 MG/ML
0.2 INJECTION, SOLUTION INTRAMUSCULAR; INTRAVENOUS; SUBCUTANEOUS
Status: DISCONTINUED | OUTPATIENT
Start: 2022-06-17 | End: 2022-06-17 | Stop reason: HOSPADM

## 2022-06-17 RX ORDER — OXYTOCIN 10 [USP'U]/ML
10 INJECTION, SOLUTION INTRAMUSCULAR; INTRAVENOUS
Status: DISCONTINUED | OUTPATIENT
Start: 2022-06-17 | End: 2022-06-17

## 2022-06-17 RX ORDER — METOCLOPRAMIDE 10 MG/1
10 TABLET ORAL EVERY 6 HOURS PRN
Status: DISCONTINUED | OUTPATIENT
Start: 2022-06-17 | End: 2022-06-17

## 2022-06-17 RX ORDER — METOCLOPRAMIDE 10 MG/1
10 TABLET ORAL EVERY 6 HOURS PRN
Status: DISCONTINUED | OUTPATIENT
Start: 2022-06-17 | End: 2022-06-17 | Stop reason: HOSPADM

## 2022-06-17 RX ORDER — MISOPROSTOL 200 UG/1
400 TABLET ORAL
Status: DISCONTINUED | OUTPATIENT
Start: 2022-06-17 | End: 2022-06-17 | Stop reason: HOSPADM

## 2022-06-17 RX ORDER — KETOROLAC TROMETHAMINE 30 MG/ML
30 INJECTION, SOLUTION INTRAMUSCULAR; INTRAVENOUS
Status: DISCONTINUED | OUTPATIENT
Start: 2022-06-17 | End: 2022-06-17

## 2022-06-17 RX ORDER — DEXTROSE MONOHYDRATE 25 G/50ML
25-50 INJECTION, SOLUTION INTRAVENOUS
Status: DISCONTINUED | OUTPATIENT
Start: 2022-06-17 | End: 2022-06-17

## 2022-06-17 RX ORDER — LIDOCAINE 40 MG/G
CREAM TOPICAL
Status: DISCONTINUED | OUTPATIENT
Start: 2022-06-17 | End: 2022-06-17

## 2022-06-17 RX ORDER — ONDANSETRON 4 MG/1
4 TABLET, ORALLY DISINTEGRATING ORAL EVERY 6 HOURS PRN
Status: DISCONTINUED | OUTPATIENT
Start: 2022-06-17 | End: 2022-06-17 | Stop reason: HOSPADM

## 2022-06-17 RX ORDER — METHYLERGONOVINE MALEATE 0.2 MG/ML
200 INJECTION INTRAVENOUS
Status: DISCONTINUED | OUTPATIENT
Start: 2022-06-17 | End: 2022-06-17 | Stop reason: HOSPADM

## 2022-06-17 RX ORDER — ONDANSETRON 2 MG/ML
4 INJECTION INTRAMUSCULAR; INTRAVENOUS EVERY 6 HOURS PRN
Status: DISCONTINUED | OUTPATIENT
Start: 2022-06-17 | End: 2022-06-17 | Stop reason: HOSPADM

## 2022-06-17 RX ORDER — ACETAMINOPHEN 325 MG/1
650 TABLET ORAL EVERY 4 HOURS PRN
Status: DISCONTINUED | OUTPATIENT
Start: 2022-06-17 | End: 2022-06-17

## 2022-06-17 RX ORDER — CITRIC ACID/SODIUM CITRATE 334-500MG
30 SOLUTION, ORAL ORAL
Status: DISCONTINUED | OUTPATIENT
Start: 2022-06-17 | End: 2022-06-17 | Stop reason: HOSPADM

## 2022-06-17 RX ORDER — PENICILLIN G POTASSIUM 5000000 [IU]/1
5 INJECTION, POWDER, FOR SOLUTION INTRAMUSCULAR; INTRAVENOUS ONCE
Status: DISCONTINUED | OUTPATIENT
Start: 2022-06-17 | End: 2022-06-17

## 2022-06-17 RX ORDER — BISACODYL 10 MG
10 SUPPOSITORY, RECTAL RECTAL DAILY PRN
Status: DISCONTINUED | OUTPATIENT
Start: 2022-06-19 | End: 2022-06-17

## 2022-06-17 RX ORDER — NALOXONE HYDROCHLORIDE 0.4 MG/ML
0.4 INJECTION, SOLUTION INTRAMUSCULAR; INTRAVENOUS; SUBCUTANEOUS
Status: DISCONTINUED | OUTPATIENT
Start: 2022-06-17 | End: 2022-06-17 | Stop reason: HOSPADM

## 2022-06-17 RX ORDER — DIPHENHYDRAMINE HYDROCHLORIDE 50 MG/ML
25 INJECTION INTRAMUSCULAR; INTRAVENOUS EVERY 6 HOURS PRN
Status: DISCONTINUED | OUTPATIENT
Start: 2022-06-17 | End: 2022-06-17

## 2022-06-17 RX ORDER — PRENATAL VIT/IRON FUM/FOLIC AC 27MG-0.8MG
1 TABLET ORAL DAILY
Status: DISCONTINUED | OUTPATIENT
Start: 2022-06-17 | End: 2022-06-17

## 2022-06-17 RX ORDER — METOCLOPRAMIDE HYDROCHLORIDE 5 MG/ML
10 INJECTION INTRAMUSCULAR; INTRAVENOUS EVERY 6 HOURS PRN
Status: DISCONTINUED | OUTPATIENT
Start: 2022-06-17 | End: 2022-06-17

## 2022-06-17 RX ORDER — LIDOCAINE 40 MG/G
CREAM TOPICAL
Status: DISCONTINUED | OUTPATIENT
Start: 2022-06-17 | End: 2022-06-17 | Stop reason: HOSPADM

## 2022-06-17 RX ORDER — BETAMETHASONE SODIUM PHOSPHATE AND BETAMETHASONE ACETATE 3; 3 MG/ML; MG/ML
12 INJECTION, SUSPENSION INTRA-ARTICULAR; INTRALESIONAL; INTRAMUSCULAR; SOFT TISSUE EVERY 24 HOURS
Status: DISCONTINUED | OUTPATIENT
Start: 2022-06-17 | End: 2022-06-17

## 2022-06-17 RX ORDER — TRANEXAMIC ACID 10 MG/ML
1 INJECTION, SOLUTION INTRAVENOUS EVERY 30 MIN PRN
Status: DISCONTINUED | OUTPATIENT
Start: 2022-06-17 | End: 2022-06-17 | Stop reason: HOSPADM

## 2022-06-17 RX ORDER — CARBOPROST TROMETHAMINE 250 UG/ML
250 INJECTION, SOLUTION INTRAMUSCULAR
Status: DISCONTINUED | OUTPATIENT
Start: 2022-06-17 | End: 2022-06-18 | Stop reason: HOSPADM

## 2022-06-17 RX ORDER — DOCUSATE SODIUM 100 MG/1
100 CAPSULE, LIQUID FILLED ORAL DAILY
Status: DISCONTINUED | OUTPATIENT
Start: 2022-06-17 | End: 2022-06-18 | Stop reason: HOSPADM

## 2022-06-17 RX ORDER — ONDANSETRON 2 MG/ML
4 INJECTION INTRAMUSCULAR; INTRAVENOUS EVERY 6 HOURS PRN
Status: DISCONTINUED | OUTPATIENT
Start: 2022-06-17 | End: 2022-06-17

## 2022-06-17 RX ORDER — OXYTOCIN/0.9 % SODIUM CHLORIDE 30/500 ML
340 PLASTIC BAG, INJECTION (ML) INTRAVENOUS CONTINUOUS PRN
Status: DISCONTINUED | OUTPATIENT
Start: 2022-06-17 | End: 2022-06-17 | Stop reason: HOSPADM

## 2022-06-17 RX ORDER — MISOPROSTOL 200 UG/1
TABLET ORAL
Status: DISCONTINUED
Start: 2022-06-17 | End: 2022-06-17 | Stop reason: WASHOUT

## 2022-06-17 RX ORDER — MISOPROSTOL 200 UG/1
800 TABLET ORAL
Status: DISCONTINUED | OUTPATIENT
Start: 2022-06-17 | End: 2022-06-17 | Stop reason: HOSPADM

## 2022-06-17 RX ORDER — PENICILLIN G 3000000 [IU]/50ML
3 INJECTION, SOLUTION INTRAVENOUS EVERY 4 HOURS
Status: DISCONTINUED | OUTPATIENT
Start: 2022-06-17 | End: 2022-06-17

## 2022-06-17 RX ORDER — IBUPROFEN 800 MG/1
800 TABLET, FILM COATED ORAL
Status: DISCONTINUED | OUTPATIENT
Start: 2022-06-17 | End: 2022-06-17

## 2022-06-17 RX ORDER — HYDROXYZINE HYDROCHLORIDE 50 MG/1
50 TABLET, FILM COATED ORAL
Status: DISCONTINUED | OUTPATIENT
Start: 2022-06-17 | End: 2022-06-17

## 2022-06-17 RX ORDER — SODIUM CHLORIDE 9 MG/ML
INJECTION, SOLUTION INTRAVENOUS CONTINUOUS
Status: DISCONTINUED | OUTPATIENT
Start: 2022-06-17 | End: 2022-06-17 | Stop reason: HOSPADM

## 2022-06-17 RX ORDER — OXYTOCIN 10 [USP'U]/ML
INJECTION, SOLUTION INTRAMUSCULAR; INTRAVENOUS
Status: DISCONTINUED
Start: 2022-06-17 | End: 2022-06-17 | Stop reason: HOSPADM

## 2022-06-17 RX ORDER — CITRIC ACID/SODIUM CITRATE 334-500MG
30 SOLUTION, ORAL ORAL ONCE
Status: DISCONTINUED | OUTPATIENT
Start: 2022-06-17 | End: 2022-06-17 | Stop reason: HOSPADM

## 2022-06-17 RX ORDER — ONDANSETRON 4 MG/1
4 TABLET, ORALLY DISINTEGRATING ORAL EVERY 6 HOURS PRN
Status: DISCONTINUED | OUTPATIENT
Start: 2022-06-17 | End: 2022-06-17

## 2022-06-17 RX ORDER — PROCHLORPERAZINE MALEATE 10 MG
10 TABLET ORAL EVERY 6 HOURS PRN
Status: DISCONTINUED | OUTPATIENT
Start: 2022-06-17 | End: 2022-06-17 | Stop reason: HOSPADM

## 2022-06-17 RX ORDER — CARBOPROST TROMETHAMINE 250 UG/ML
250 INJECTION, SOLUTION INTRAMUSCULAR
Status: DISCONTINUED | OUTPATIENT
Start: 2022-06-17 | End: 2022-06-17 | Stop reason: HOSPADM

## 2022-06-17 RX ORDER — HYDROCORTISONE 25 MG/G
CREAM TOPICAL 3 TIMES DAILY PRN
Status: DISCONTINUED | OUTPATIENT
Start: 2022-06-17 | End: 2022-06-18 | Stop reason: HOSPADM

## 2022-06-17 RX ORDER — IBUPROFEN 800 MG/1
800 TABLET, FILM COATED ORAL EVERY 6 HOURS PRN
Status: DISCONTINUED | OUTPATIENT
Start: 2022-06-17 | End: 2022-06-18 | Stop reason: HOSPADM

## 2022-06-17 RX ORDER — MISOPROSTOL 200 UG/1
400 TABLET ORAL
Status: DISCONTINUED | OUTPATIENT
Start: 2022-06-17 | End: 2022-06-18 | Stop reason: HOSPADM

## 2022-06-17 RX ORDER — NICOTINE POLACRILEX 4 MG
15-30 LOZENGE BUCCAL
Status: DISCONTINUED | OUTPATIENT
Start: 2022-06-17 | End: 2022-06-18 | Stop reason: HOSPADM

## 2022-06-17 RX ORDER — AZITHROMYCIN 250 MG/1
1000 TABLET, FILM COATED ORAL ONCE
Status: COMPLETED | OUTPATIENT
Start: 2022-06-17 | End: 2022-06-17

## 2022-06-17 RX ORDER — OXYTOCIN/0.9 % SODIUM CHLORIDE 30/500 ML
340 PLASTIC BAG, INJECTION (ML) INTRAVENOUS CONTINUOUS PRN
Status: DISCONTINUED | OUTPATIENT
Start: 2022-06-17 | End: 2022-06-18 | Stop reason: HOSPADM

## 2022-06-17 RX ORDER — ACETAMINOPHEN 325 MG/1
650 TABLET ORAL EVERY 4 HOURS PRN
Status: DISCONTINUED | OUTPATIENT
Start: 2022-06-17 | End: 2022-06-17 | Stop reason: HOSPADM

## 2022-06-17 RX ORDER — DIPHENHYDRAMINE HCL 25 MG
25 CAPSULE ORAL EVERY 6 HOURS PRN
Status: DISCONTINUED | OUTPATIENT
Start: 2022-06-17 | End: 2022-06-17

## 2022-06-17 RX ORDER — MISOPROSTOL 200 UG/1
800 TABLET ORAL
Status: DISCONTINUED | OUTPATIENT
Start: 2022-06-17 | End: 2022-06-18 | Stop reason: HOSPADM

## 2022-06-17 RX ORDER — PROCHLORPERAZINE 25 MG
25 SUPPOSITORY, RECTAL RECTAL EVERY 12 HOURS PRN
Status: DISCONTINUED | OUTPATIENT
Start: 2022-06-17 | End: 2022-06-17 | Stop reason: HOSPADM

## 2022-06-17 RX ORDER — OXYTOCIN/0.9 % SODIUM CHLORIDE 30/500 ML
100-340 PLASTIC BAG, INJECTION (ML) INTRAVENOUS CONTINUOUS PRN
Status: DISCONTINUED | OUTPATIENT
Start: 2022-06-17 | End: 2022-06-17

## 2022-06-17 RX ORDER — DEXTROSE MONOHYDRATE 25 G/50ML
25-50 INJECTION, SOLUTION INTRAVENOUS
Status: DISCONTINUED | OUTPATIENT
Start: 2022-06-17 | End: 2022-06-17 | Stop reason: HOSPADM

## 2022-06-17 RX ORDER — OXYTOCIN 10 [USP'U]/ML
10 INJECTION, SOLUTION INTRAMUSCULAR; INTRAVENOUS
Status: DISCONTINUED | OUTPATIENT
Start: 2022-06-17 | End: 2022-06-18 | Stop reason: HOSPADM

## 2022-06-17 RX ORDER — AMOXICILLIN 250 MG/1
250 CAPSULE ORAL 3 TIMES DAILY
Status: DISCONTINUED | OUTPATIENT
Start: 2022-06-19 | End: 2022-06-17

## 2022-06-17 RX ORDER — NICOTINE POLACRILEX 4 MG
15-30 LOZENGE BUCCAL
Status: DISCONTINUED | OUTPATIENT
Start: 2022-06-17 | End: 2022-06-17 | Stop reason: HOSPADM

## 2022-06-17 RX ORDER — BISACODYL 10 MG
10 SUPPOSITORY, RECTAL RECTAL DAILY PRN
Status: DISCONTINUED | OUTPATIENT
Start: 2022-06-17 | End: 2022-06-18 | Stop reason: HOSPADM

## 2022-06-17 RX ORDER — NICOTINE POLACRILEX 4 MG
15-30 LOZENGE BUCCAL
Status: DISCONTINUED | OUTPATIENT
Start: 2022-06-17 | End: 2022-06-17

## 2022-06-17 RX ORDER — DEXTROSE MONOHYDRATE 25 G/50ML
25-50 INJECTION, SOLUTION INTRAVENOUS
Status: DISCONTINUED | OUTPATIENT
Start: 2022-06-17 | End: 2022-06-18 | Stop reason: HOSPADM

## 2022-06-17 RX ORDER — OXYTOCIN/0.9 % SODIUM CHLORIDE 30/500 ML
PLASTIC BAG, INJECTION (ML) INTRAVENOUS
Status: DISCONTINUED
Start: 2022-06-17 | End: 2022-06-17 | Stop reason: HOSPADM

## 2022-06-17 RX ORDER — ACETAMINOPHEN 325 MG/1
650 TABLET ORAL EVERY 4 HOURS PRN
Status: DISCONTINUED | OUTPATIENT
Start: 2022-06-17 | End: 2022-06-18 | Stop reason: HOSPADM

## 2022-06-17 RX ORDER — AMPICILLIN 2 G/1
2 INJECTION, POWDER, FOR SOLUTION INTRAVENOUS EVERY 6 HOURS
Status: DISCONTINUED | OUTPATIENT
Start: 2022-06-17 | End: 2022-06-17

## 2022-06-17 RX ORDER — PROCHLORPERAZINE 25 MG
25 SUPPOSITORY, RECTAL RECTAL EVERY 12 HOURS PRN
Status: DISCONTINUED | OUTPATIENT
Start: 2022-06-17 | End: 2022-06-17

## 2022-06-17 RX ORDER — METOCLOPRAMIDE HYDROCHLORIDE 5 MG/ML
10 INJECTION INTRAMUSCULAR; INTRAVENOUS EVERY 6 HOURS PRN
Status: DISCONTINUED | OUTPATIENT
Start: 2022-06-17 | End: 2022-06-17 | Stop reason: HOSPADM

## 2022-06-17 RX ORDER — FENTANYL CITRATE 50 UG/ML
100 INJECTION, SOLUTION INTRAMUSCULAR; INTRAVENOUS
Status: DISCONTINUED | OUTPATIENT
Start: 2022-06-17 | End: 2022-06-17 | Stop reason: HOSPADM

## 2022-06-17 RX ORDER — PROCHLORPERAZINE MALEATE 10 MG
10 TABLET ORAL EVERY 6 HOURS PRN
Status: DISCONTINUED | OUTPATIENT
Start: 2022-06-17 | End: 2022-06-17

## 2022-06-17 RX ORDER — TRANEXAMIC ACID 10 MG/ML
1 INJECTION, SOLUTION INTRAVENOUS EVERY 30 MIN PRN
Status: DISCONTINUED | OUTPATIENT
Start: 2022-06-17 | End: 2022-06-18 | Stop reason: HOSPADM

## 2022-06-17 RX ORDER — MODIFIED LANOLIN
OINTMENT (GRAM) TOPICAL
Status: DISCONTINUED | OUTPATIENT
Start: 2022-06-17 | End: 2022-06-18 | Stop reason: HOSPADM

## 2022-06-17 RX ADMIN — ACETAMINOPHEN 325MG 650 MG: 325 TABLET ORAL at 21:25

## 2022-06-17 RX ADMIN — PRENATAL VITAMINS-IRON FUMARATE 27 MG IRON-FOLIC ACID 0.8 MG TABLET 1 TABLET: at 08:46

## 2022-06-17 RX ADMIN — SODIUM CHLORIDE, POTASSIUM CHLORIDE, SODIUM LACTATE AND CALCIUM CHLORIDE 1000 ML: 600; 310; 30; 20 INJECTION, SOLUTION INTRAVENOUS at 08:24

## 2022-06-17 RX ADMIN — ACETAMINOPHEN 325MG 650 MG: 325 TABLET ORAL at 17:17

## 2022-06-17 RX ADMIN — KETOROLAC TROMETHAMINE 30 MG: 30 INJECTION, SOLUTION INTRAMUSCULAR at 11:49

## 2022-06-17 RX ADMIN — AZITHROMYCIN MONOHYDRATE 1000 MG: 250 TABLET ORAL at 06:17

## 2022-06-17 RX ADMIN — DOCUSATE SODIUM 100 MG: 100 CAPSULE, LIQUID FILLED ORAL at 21:35

## 2022-06-17 RX ADMIN — AMPICILLIN 2 G: 2 INJECTION, POWDER, FOR SOLUTION INTRAVENOUS at 06:24

## 2022-06-17 RX ADMIN — BETAMETHASONE ACETATE AND BETAMETHASONE SODIUM PHOSPHATE 12 MG: 3; 3 INJECTION, SUSPENSION INTRA-ARTICULAR; INTRALESIONAL; INTRAMUSCULAR; SOFT TISSUE at 06:26

## 2022-06-17 ASSESSMENT — ACTIVITIES OF DAILY LIVING (ADL)
ADLS_ACUITY_SCORE: 20

## 2022-06-17 NOTE — PLAN OF CARE
Problem: Plan of Care - These are the overarching goals to be used throughout the patient stay.    Goal: Optimal Comfort and Wellbeing  Intervention: Provide Person-Centered Care  Recent Flowsheet Documentation  Taken 6/17/2022 1345 by Fauzia Marcelino RN  Trust Relationship/Rapport: care explained   Data: VSS, postpartum assessments WNL. She is voiding without difficulty, up ad kinjal. Perineum appears to be healing well, lochia WNL, no s/s infection. Pumping independently.  Taking tyl and ibu for back pain and perineal pain. Reports good pain management.   Action: Education provided on plan of care, discharge goals.   Response: Pt is agreeable with her plan of care. Positive attachment behaviors observed with infant. Support person present. Anticipate discharge 6/18 or 6/19.

## 2022-06-17 NOTE — PROGRESS NOTES
[facility]  Interval Antepartum Progress Note    S:  Called to the patient's room to review FHT and assess contractions. The patient states she is feeling more regular contractions now, 1-2 times every 10-15 minutes. Ongoing leaking fluids. Denies vaginal bleeding. Baseline fetal movement. All other ROS negative.       O:   Patient Vitals for the past 4 hrs:   BP Temp Temp src Resp   22 0632 99/54 98.2  F (36.8  C) Oral 16     SVE: 3/60/-2, soft, posterior (0840) > 5/70/0 (0940)    FHT: Baseline 140bpm, mod variability, pos accelerations, intermittent late decelerations  [place]: 2-3 contractions in 10 minutes    Assessment/Plan;   Ms. Di Kennedy is a 32 year old  at 33w1d by LMP c/w 8w US presenting with PPROM. Now with increasing contractions, advancing cervical dilation     PPROM   Spontaneous  Labor   - Given increasing frequency of contractions and advancing cervical dilation, plan to move patient to L&D at this time.   - NICU informed, RN aware.     Anemia   - Hgb 7.4 this AM, previously 11 in 2022. Plan to repeat CBC at this time to assess if relative anemia spurious.   - PIV x2 placed.   - Type and Cross x2u.     Fetal Well-Being   Routine Prenatal Care   - Category II FHT with occasional late decelerations amenable to position changes.   - Rh positive, Rubella immune, GBS unknown - pending, Placenta anterior     Tatum Cardoso MD  OB/GYN PGY-3  22 9:40 AM        FACULTY NOTE:   Patient is now going into spontaneous labor.  Moving to L&D. Prefers unmedicated delivery.  IV in place.  Has received Amp for latency Abx which will cover GBS.     Emily Wills DO FACOG  Maternal Fetal Medicine Specialist  Pager: [phone]  Mobile: [phone]

## 2022-06-17 NOTE — PLAN OF CARE
This is a late entry due to patient care at the bedside. Care assumed at 1100, report from Bhumika Currie, RN at 1050. FTHs difficult to assess as patient using telemetry and position changes, mainly bending over for comfort, spot checks performed as permissed by patient at 135, no contiguous tracing obtained but frequent audible heart tones. Pt reported the urge to push at 1117, Dr Nick at bedside ay 1119, a cervical exam in hands ans knees revealed a completely dilated cervix with the baby on the perineum. NICU called for delivery, Dr Doshi in room for a delivered boy at 1121 with a lusty cry. Delayed cord clamping performed, mother able to hold, then brought to warmer for NICU assessment and interventions.

## 2022-06-17 NOTE — PROGRESS NOTES
LATE ENTRY:  Pt was roxana when this RN assumed cares at 0730. Contractions were irregular but were becoming painful (cramping). Dr. Cardoso notified and plan made to check SVE for a baseline and administer a fluid bolus. SVE completed by Dr. Cardoso and patient was found to 3/60/-2 at 0810. Liter fluid bolus administered over an hour. By the end of the hour the contractions persisted and patient reported contractions were becoming increasingly painful. Dr. Cardoso paged and notified of this. Dr. Cardoso came to bedside. SVE recheck was 5/70-80/-1. Plan made to move patient to the labor and delivery unit. Pt transferred at 1015. Report given to Yoseph Ortiz RN at 1055.

## 2022-06-17 NOTE — H&P
[facility]  OB History and Physical      Di Hancock MRN# [id number]   Age: 32 year old YOB: 1990     CC:  Leakage of fluid    HPI:  Di Hancock is a 32 year old  at 33w1d by LMP c/w 8w3d US, who presents with leakage of fluid at 0100. She woke up with her bed soaked. At first she thought it was urine and so went to go void, but then had another gush of fluid. Fluid was clear and odorless. She has had some abdominal cramping/contractions on and off for the last few weeks, no changes in this. Is feeling slightly crampy today, but this is the same as usual. Denies vaginal bleeding and LOF. Denies dysuria, upper abdominal pain, fevers, chills, trauma.  Reports normal fetal movement.    She denies history of postpartum hemorrhage, shoulder dystocia, high blood pressures, asthma. She denies prior abdominal surgeries. Of note, her first baby did have 4 broken ribs after delivery, though there was no concern for shoulder dystocia.    ROS:   Complete 10-point ROS negative except as noted in HPI.She denies headache, blurry vision, chest pain, shortness of breath, RUQ pain.    Pregnancy Complications:  - GDMA1  - Velamentous cord insertion    Prenatal Labs:   Lab Results   Component Value Date    AS Negative 2022    HEPBANG Nonreactive 2022    CHPCRT Negative 2022    GCPCRT Negative 2022    HGB 11.2 (L) 2022       GBS Status: unknown    OB History  OB History    Para Term  AB Living   4 2 2 0 1 2   SAB IAB Ectopic Multiple Live Births   0 1 0 0 2      # Outcome Date GA Lbr Dell/2nd Weight Sex Delivery Anes PTL Lv   4 Current            3 Term 17 37w1d 02:18 / 00:10 3.43 kg (7 lb 9 oz) F Vag-Spont None N SAILAJA      Name: Lynda      Apgar1: 9  Apgar5: 9   2 Term 05/29/15 39w0d  4.082 kg (9 lb) F Vag-Spont EPI N SAILAJA      Name: Ngoc Christelle hancock Pineda   1 IAB 14 5w0d             Birth Comments: Medical termination, no complications.        Obstetric Comments   No GDM, no HTN, no shoulder dystocia no PPH, no PPMD.       PMHx:   Past Medical History:   Diagnosis Date     Known health problems: none      PSHx:   Past Surgical History:   Procedure Laterality Date     NO HISTORY OF SURGERY       Meds:   Medications Prior to Admission   Medication Sig Dispense Refill Last Dose     blood glucose (NO BRAND SPECIFIED) lancets standard Use to test blood sugar 4 times daily or as directed. 1 each 3 6/16/2022 at Unknown time     blood glucose (NO BRAND SPECIFIED) test strip Use to test blood sugar 4 times daily or as directed. 100 strip 3 6/16/2022 at Unknown time     blood glucose monitoring (ACCU-CHEK QUAN PLUS) meter device kit Use to test blood sugar 4 times daily or as directed. 1 kit 0 6/16/2022 at Unknown time     cholecalciferol (VITAMIN D3) 125 mcg (5000 units) capsule Take 1 capsule (125 mcg) by mouth daily Take one capsule daily. 90 capsule 3 Past Week at Unknown time     docusate sodium (COLACE) 100 MG capsule TAKE 1 CAPSULE BY MOUTH TWICE A DAY   Past Week at Unknown time     docusate sodium (COLACE) 100 MG tablet Take 1 tablet (100 mg) by mouth 2 times daily 90 tablet 1 Past Week at Unknown time     KETOSTIX test strip    6/16/2022 at Unknown time     Lancets (ONETOUCH DELICA PLUS [id number]) MISC USE TO TEST BLOOD SUGAR 4 TIMES DAILY OR AS DIRECTED.   6/16/2022 at Unknown time     Prenatal Vit-Fe Fumarate-FA (PRENATAL MULTIVITAMIN W/IRON) 27-0.8 MG tablet Take 1 tablet by mouth daily   6/16/2022 at Unknown time     Urine Glucose-Ketones Test STRP 1 strip daily Use one strip to test first morning urine daily. 100 strip 1 6/16/2022 at Unknown time     Allergies:  No Known Allergies   FmHx:   Family History   Problem Relation Age of Onset     No Known Problems Mother      Diabetes Brother      Diabetes Maternal Aunt      Coronary Artery Disease No family hx of      Cancer No family hx of      SocHx:  She denies any tobacco, alcohol, or other drug  use during this pregnancy.    PE:  Vit:   Patient Vitals for the past 4 hrs:   BP Temp Temp src Resp   22 0230 112/55 97.8  F (36.6  C) Oral 16      Gen: Well-appearing, NAD, comfortable  CV: Well perfused, regular rate  Pulm: Breathing comfortably  Abd: Soft, gravid, fundus is non-tender  Ext: No LE edema b/l    SSE (Per CNM): Normal appearing external genitalia, normal appearing vaginal vault with pooling of clear fluid, cervix appears multiparous, leakage of fluid noted from cervical os with valsalva.    Pres:  cephalic by BSUS  Memb: Ruptured              FHT: Baseline 140, mod variability, accelerations present, no decelerations   [place]: 0-1 contractions in 10 minutes      Assessment/Plan:  Di Kennedy is a 32 year old , at 33w1d by LMP c/w 8w3d US, who presents with PPROM.    PPROM  ROM at 0100. Fluid is clear. Afebrile, normal VS. We discussed recommendations for admission until delivery due to risks of infection, and  labor. We reviewed that goal would be to get to 34w0d.   - Will admit to antepartum unit until delivery  - Labs: CBC, T&S, RPR, Wet prep, GBS, G/C, UA/UCx  - Latency antibiotics x7 days: IV ampicillin 2g q 6h x48h (Days #1-2), PO Azithromycin 1g po once (Day #1), PO Amoxicillin 250mg q8h (Days #3-7)  - TID monitoring as tracing has been category 1, reactive and reassuring and contractions have been minimal.  - BMZ ordered for fetal lung maturity.  - Will monitor for fevers, fundal tenderness, foul smelling vaginal discharge  - NICU, anesthesia, social work consults  - Discussed indications for delivery would include  labor, concern for abruption, concern for developing infection, signs of fetal distress, or signs of maternal distress.  - Discussed that as presentation is cephalic, could plan for vaginal delivery, however did discuss possibility of  section in the case of an emergent or urgent indication such as fetal or maternal distress.  - Discussed  expectations for surgery and recovery, including surgical risks of bleeding; need for transfusion; infection; injury to uterus, fallopian tubes, ovaries, bowel, bladder, nerves, blood vessels, ureters, or baby. Additionally discussed remote risk of hysterectomy in the case of uncontrollable bleeding. Discussed possibility of classical uterine incision and in this case the need for  sections for all future deliveries. Patient agreeable to proceed with surgery, written informed consent signed. Blood transfusion consent signed as well.    GDMA1  - Well controlled with diet  - Will order QID BG, MSSI as we are giving betamethasone and this will likely increase her blood glucose levels.    The patient was discussed with Dr. Luu who is in agreement with the treatment plan.    Jenny Mcmillan MD  Obstetrics & Gynecology, PGY-3  2022 5:26 AM    Physician Attestation   IEmily DO, saw and evaluated Di Kennedy with the resident.      I personally reviewed the vital signs, medications, labs and imaging.    My key history or physical exam findings: Patient admitted for PPROM.  Uncomplicated pregnancy aside from PPROM.  Leaking clear fluid.  +FM.  Some intermittent cramping.  No fever/chills.  No bleeding.     Key management decisions made by me:   We reviewed the pathophysiology of PPROM and the management.  Reviewed the latency Abx, BMZ course, etc.  Discussed the high risk for labor, infection, abruption, etc. If she remains stable, delivery would be recommended at 34 weeks.     NST reactive  Tooc: irreg contractions.     Emily Wills DO  Date of Service (when I saw the patient): 22    Time Spent on this Encounter   IEmily DO, spent a total of 30 minutes face to face or coordinating care of Di Kennedy.  Over 50% of my time on the unit was spent counseling the patient and/or coordinating care regarding PPROM.

## 2022-06-17 NOTE — PLAN OF CARE
Pt VSS, afebrile. Pt denies pain. Leaking clear fluid. Pt stated she was feeling cramping this morning, EFM placed. See flowsheet for FHT and contractions. Beta x1 administered @ 0630.

## 2022-06-17 NOTE — PROVIDER NOTIFICATION
06/17/22 0719   Provider Notification   Provider Name/Title Dr. Cardoso (G3)   Method of Notification Electronic Page   Request Evaluate - Remote   Notification Reason Lab/Diagnostic Study   FYI- pt hgb 7.5

## 2022-06-17 NOTE — TELEPHONE ENCOUNTER
"Returned page from patient.     @ 33w1d.   States that she was woken up by a \"gush of fluid\" out of her vagina. Did need to urinate but held it until she got to the bathroom. [place] another gush, then urinated. Thinks this is more than normal. Spot on bed and on blanket were about the size of her hand. No other leaking since that time.   No contractions, feeling baby move.     Has no hx of  labor or PPROM.     Recommended assessment on L&D. Will plan to do spec exam to r/o pprom- pooling, vasalva, rom plus, ferning slides. Will also collect wet prep, gc/ct. Assess for need for ffn.     Pt verbalized understanding. Will come to BP.  Charge RN notified.    ANNA Orellana, KRISTIN      "

## 2022-06-17 NOTE — PLAN OF CARE
Patient arrived to [facility] unit via wheelchair at 1340,with belongings, accompanied by spouse/ significant other, with infant in NICU. Received report from Yoseph RIVERA. Unit and room orientation completd. Call light given; no concerns present at this time. Continue with plan of care.

## 2022-06-17 NOTE — CONSULTS
_       [facility]                      Neonatology Advanced Practice Antepartum Counseling Consult      I was asked to provide antepartum counseling for Di Kennedy at the request of Dr. Luu secondary to PPROM and  labor. Ms. Kennedy is currently 33.1 weeks and has a hx significant for PPROM and GDM, diet controlled. Betamethasone was administered on 22. Ms. Kennedy, accompanied by her significant other, was counseled on the expected hospital course, potential risks, and outcomes associated with an infant born at approximately 33 weeks gestation. The counseling included: initial delivery room stabilization, respiratory course, lung development, infection, nutrition, and growth and development. Please feel free to call with any additional questions or concerns.      Oscar Wilson   Advanced Practice Service    Intensive Care Unit  [facility]      Floor Time (min): 5  Face to Face Time (min): 15  Total Time (minutes): 20  More than 50% of my time was spent in direct, face to face, antepartum counseling with the above patient.    OSCAR Wilson, NN, 2022 10:02 AM  [facility]

## 2022-06-17 NOTE — PROGRESS NOTES
HOSPITAL TRIAGE NOTE  ===================    CHIEF COMPLAINT  ========================  Di Kennedy is a 32 year old patient presenting today at 33w1d for evaluation of leaking of fluid.    Patient's last menstrual period was 10/28/2021.  Estimated Date of Delivery: Aug 4, 2022       HPI  ==================   Spoke with pt on the phone, instructed her to present to triage. Reports that she woke up after feeling a gush of fluid around 0100. Went to bathroom and had more leaking. Wet spots on bed and blanket. Did not feel any contractions at that time.  Since arrival to triage, has continued to leak. Fluid appears clear. Feeling some mild cramping. + fetal movement.     Denies fever, cough, SOB or chest pain. Denies having contact with anyone who is Covid-19 positive. Pt has had Covid-19 Vaccinations.  Agreeable to Covid-19 testing.    Prenatal record and labs reviewed from [facility], through [employer] EMR.    CONTRACTIONS: rare  ABDOMINAL PAIN: none  FETAL MOVEMENT: active    VAGINAL BLEEDING: none  RUPTURE OF MEMBRANES:  Leaking, pending assessment  PELVIC PAIN: none    PREGNANCY COMPLICATIONS:   GDMA1  Velamentous cord insertion      REVIEW OF SYSTEMS  =====================  C: NEGATIVE for fever, chills  I: NEGATIVE for worrisome rashes, moles or lesions  E: NEGATIVE for vision changes or irritation  R: NEGATIVE for significant cough or SOB  CV: NEGATIVE for chest pain, palpitations or varicosities  GI: NEGATIVE for nausea, abdominal pain, heartburn, or change in bowel habits  : NEGATIVE for frequency, dysuria, or hematuria  M: NEGATIVE for significant arthralgias or myalgia  N: NEGATIVE for headache, weakness, dizziness or paresthesias  P: NEGATIVE for changes in mood or affect    PROBLEM LIST  ===============  Patient Active Problem List    Diagnosis Date Noted     Encounter for triage in pregnant patient 2022     Priority: Medium     H/o of birth injury with first baby - 4  "broken ribs w/o shoulder dystocia in 2015 04/29/2022     Priority: Medium     Delivery note reviewed from 05/29/2015 \"The infant head delivered OA and restituted to FEDERICA.  The shoulders and body followed with maternal effort, shoulders were tight, but released with maternal pushing and normal downward traction\"  9lb baby, reports baby had 4 broken ribs after this birth.   Per discharge summary note: 05/2019: \"The baby was found to have rib fractures but is otherwise doing well.\"       GDM (gestational diabetes mellitus), class A1 04/29/2022     Priority: Medium     4/29/2: failed 1hr 195, 3 hr GTT failed 5/3/22.   Growth US 6/6: EFW 79%tile. AC 85%tile.        Velamentous insertion of umbilical cord 03/04/2022     Priority: Medium     3/4/22:  A velamentous cord insertion increases the risk for FGR. We recommend to assess fetal growth every 4 weeks. If there is no FGR we also recommend to begin weekly  BPPs at 36 weeks. Should FGR occur then surveillance would be recommended as appropriate.   We have scheduled the patient to return to [facility] in 4 weeks to reassess anatomy and growth.        Vitamin D deficiency 01/05/2022     Priority: Medium     1/5/22:  Noted to be 11 with intake, instruct on supplement at NOB.  4/29/22: supplement re-ordered, not taking but will start.        Normal pregnancy in multigravida 12/23/2021     Priority: Medium     [facility] CNM pt  Partner's name: Bora  [ ] NOB folder  [x ] Dating  [x ] First tri screen ordered  [ ] QS/AFP ordered declined  [x ] Fetal anatomy US ordered  [x ] Rubella immune  [x ] Hep B immune  [ ] Pap due  [ ] Started ASA   [ ] NO utox in labor   [x ] COVID vaccine completed, needs booster  _____________________________________  [x ] EOB folder  [ x] PP Contraception plan: paragard IUD If tubal,consent date:  [x ] Labor plans: un-medicated, hydrotherapy.   [ ] : considering looking into   [x ] Infant feeding plan: breastfeeding  [ ] FLU shot  [ ] TDAP given  [ ] Rhogam " if needed, date:  [ ] TOLAC consent done  [ ] Waterbirth declines, consent done  [ ] GCT, passed  ________________________________________  [ ] OTC PP meds sent  [ ] Planning CS-ERAS pkt       Hip pain, bilateral 2021     Priority: Medium     Nonspecific low back pain 2021     Priority: Medium     Cervical cancer screening 2017     Priority: Medium     Formatting of this note might be different from the original.  From visit on 17:  History of abnormal pap tests?  No   NILM       27 y.o.  Plan:  Pap 2020    BV    ; Pap test history         HISTORIES  ==============  ALLERGIES:    No Known Allergies  PAST MEDICAL HISTORY  Past Medical History:   Diagnosis Date     Known health problems: none      SOCIAL HISTORY  Social History     Socioeconomic History     Marital status: Single     Spouse name: Not on file     Number of children: Not on file     Years of education: Not on file     Highest education level: Not on file   Occupational History     Not on file   Tobacco Use     Smoking status: Never Smoker     Smokeless tobacco: Never Used   Substance and Sexual Activity     Alcohol use: No     Drug use: No     Sexual activity: Yes     Partners: Male     Birth control/protection: Condom, I.U.D.   Other Topics Concern     Not on file   Social History Narrative     Not on file     Social Determinants of Health     Financial Resource Strain: Not on file   Food Insecurity: Not on file   Transportation Needs: Not on file   Physical Activity: Not on file   Stress: Not on file   Social Connections: Not on file   Intimate Partner Violence: Not on file   Housing Stability: Not on file     PARTNER: present    FAMILY HISTORY  Family History   Problem Relation Age of Onset     No Known Problems Mother      Diabetes Brother      Diabetes Maternal Aunt      Coronary Artery Disease No family hx of      Cancer No family hx of      OB HISTORY  OB History    Para Term  AB Living   4 2 2 0 1 2    SAB IAB Ectopic Multiple Live Births   0 1 0 0 2      # Outcome Date GA Lbr Dell/2nd Weight Sex Delivery Anes PTL Lv   4 Current            3 Term 07/27/17 37w1d 02:18 / 00:10 3.43 kg (7 lb 9 oz) F Vag-Spont None N SAILAJA      Name: Lynda      Apgar1: 9  Apgar5: 9   2 Term 05/29/15 39w0d  4.082 kg (9 lb) F Vag-Spont EPI N SAILAJA      Name: Ngoc Pineda   1 IAB 01/01/14 5w0d             Birth Comments: Medical termination, no complications.       Obstetric Comments   No GDM, no HTN, no shoulder dystocia no PPH, no PPMD.     Prenatal Labs:   Lab Results   Component Value Date    AS Negative 01/04/2022    RUQIGG Positive (A) 01/04/2022    HEPBANG Nonreactive 01/04/2022    HGB 11.2 (L) 04/29/2022    HIAGAB Nonreactive 01/04/2022    GLU1 195 (H) 04/29/2022       EXAM  ============  /55   Temp 97.8  F (36.6  C) (Oral)   Resp 16   LMP 10/28/2021   GENERAL APPEARANCE: healthy, alert and no distress  RESP: lungs clear to auscultation - no rales, rhonchi or wheezes  CV: regular rates and rhythm, normal S1 S2, no S3 or S4 and no murmur,and no varicosities  ABDOMEN:  soft, nontender, no epigastric pain  SKIN: no suspicious lesions or rashes  NEURO: Denies headache, blurred vision, other vision changes  PSYCH: mentation appears normal. and affect normal/bright  MS/ LEGS: Reflexes normal bilaterally    CONTRACTIONS: rare   FETAL HEART TONES: continuous EFM- baseline 125 with moderate variability and positive accelerations. No decelerations.  NST: REACTIVE    Clear fluid on chux pad  Speculum exam:  +pooling, +cough/valsalva  Moderate amount of clear fluid, scant yellow mucous  External os appears multiparous, does not appear significantly dilated  + ferning on microscope    Collected: ROM plus, ferning slides, wet prep, gc/ct, gbs    ROM + positive  Ferning positive    Other labs pending    DIAGNOSIS  ============  33w1d seen on the [facility] T[facility] - PPROM  NST: REACTIVE  Fetal Heart Tones:category one  Patient  Active Problem List   Diagnosis     Hip pain, bilateral     Nonspecific low back pain     Cervical cancer screening     Normal pregnancy in multigravida     Vitamin D deficiency     Velamentous insertion of umbilical cord     H/o of birth injury with first baby - 4 broken ribs w/o shoulder dystocia in 2015     GDM (gestational diabetes mellitus), class A1     Encounter for triage in pregnant patient       PLAN  ============  Findings consistent with PPROM.   MFM MD team consulted.   Report given to resident who will meet pt and make plan of care.  VENKATA to MD team.    ANNA Orellana, CNM

## 2022-06-17 NOTE — PROVIDER NOTIFICATION
06/17/22 0742   Provider Notification   Provider Name/Title Dr. Cardoso, G3   Method of Notification Electronic Page   Request Evaluate - Remote   Notification Reason Decels  (140 second prolonged decel at 0733, will extend monitoring)   Dr. Cardoso, G3, paged and notified of 140 second prolonged deceleration that occurred at 0733, resolved with out intervention. Will plan to extend monitoring for at least an hour post deceleration.

## 2022-06-18 VITALS
RESPIRATION RATE: 16 BRPM | SYSTOLIC BLOOD PRESSURE: 105 MMHG | HEART RATE: 72 BPM | DIASTOLIC BLOOD PRESSURE: 70 MMHG | TEMPERATURE: 98.2 F

## 2022-06-18 LAB
BACTERIA UR CULT: NORMAL
C TRACH DNA SPEC QL NAA+PROBE: NEGATIVE
GLUCOSE BLDC GLUCOMTR-MCNC: 150 MG/DL (ref 70–99)
GP B STREP DNA SPEC QL NAA+PROBE: NEGATIVE
HGB BLD-MCNC: 10.9 G/DL (ref 11.7–15.7)
N GONORRHOEA DNA SPEC QL NAA+PROBE: NEGATIVE

## 2022-06-18 PROCEDURE — 85018 HEMOGLOBIN: CPT

## 2022-06-18 PROCEDURE — 250N000013 HC RX MED GY IP 250 OP 250 PS 637

## 2022-06-18 PROCEDURE — 36415 COLL VENOUS BLD VENIPUNCTURE: CPT

## 2022-06-18 RX ORDER — AMOXICILLIN 250 MG
1 CAPSULE ORAL DAILY
Qty: 60 TABLET | Refills: 1 | Status: SHIPPED | OUTPATIENT
Start: 2022-06-18 | End: 2022-11-03

## 2022-06-18 RX ORDER — ACETAMINOPHEN 325 MG/1
650 TABLET ORAL EVERY 6 HOURS PRN
Qty: 60 TABLET | Refills: 1 | Status: SHIPPED | OUTPATIENT
Start: 2022-06-18 | End: 2022-11-03

## 2022-06-18 RX ORDER — IBUPROFEN 600 MG/1
600 TABLET, FILM COATED ORAL EVERY 6 HOURS PRN
Qty: 30 TABLET | Refills: 1 | Status: SHIPPED | OUTPATIENT
Start: 2022-06-18 | End: 2022-11-03

## 2022-06-18 RX ORDER — POLYETHYLENE GLYCOL 3350 17 G/17G
1 POWDER, FOR SOLUTION ORAL DAILY
Qty: 507 G | Refills: 1 | Status: SHIPPED | OUTPATIENT
Start: 2022-06-18 | End: 2022-11-03

## 2022-06-18 RX ADMIN — DOCUSATE SODIUM 100 MG: 100 CAPSULE, LIQUID FILLED ORAL at 08:22

## 2022-06-18 ASSESSMENT — ACTIVITIES OF DAILY LIVING (ADL)
ADLS_ACUITY_SCORE: 20

## 2022-06-18 NOTE — LACTATION NOTE
"This note was copied from a baby's chart.  D:  I met with Di on [facility], Aden is her third baby. She  her older two daughters for two years each. She is normally in good health, takes no medications, and has no history of breast/chest surgery or trauma.  She has already started to pump getting small puddles.   I:  I gave her a folder of introductory materials and went over pumping guidelines.  I reviewed physiology of colostrum and milk production, pumping guidelines, and I gave her a log and encouraged her to use it.   I explained how to access the videos \"Hand Expression\" and \"Maximizing Milk Production\"; as well as other helpful books and websites.   We discussed hands-on pumping techniques and usefulness of a hands-free pumping bra; I gave her a belly band and instructed her in its use.  We discussed skin to skin holding and how to reach your breastfeeding goals, discussed nuzzling while on NCPAP.  We talked about medications during breastfeeding.  She verbalized understanding via teachback.  Her [facility] RN sent her home with [employer] rental pump, we discussed calling insurance Monday for benefit information on rental and purchase pump. She also has what she thinks is a Medela Freestyle pump from her previous pregnancies. We went over [facility] guidelines on cleaning pump parts and milk storage.   A:  Mom has information she needs to initiate her supply.   P: Will continue to provide lactation support.    LAURIE Jenkins, RN, IBCLC          "

## 2022-06-18 NOTE — PROVIDER NOTIFICATION
06/18/22 0624   Provider Notification   Provider Name/Title G3   Method of Notification Electronic Page   Request Evaluate-Remote   Notification Reason Status Update   Hello,  FYI- Pt's BG was 150.

## 2022-06-18 NOTE — L&D DELIVERY NOTE
OB Vaginal Delivery Note    Di Kennedy MRN# [id number]   Age: 32 year old YOB: 1990       Delivery Summary    Di Kennedy is a 32 year old now  at 33w1d, admitted for PPROM in pregnancy complicated by GDMA1 and velamentous cord insertion. She was started on latency antibiotics and was given BMZ for fetal lung maturity. On HD#1 she started spontaneously laboring. She progressed to complete dilation, pushed and with good maternal effort delivered a male infant on  at 1121 from the OA position. Shoulders delivered easily. Infant with spontaneous cry. Placed on mother's chest. Cord clamping delayed 30 seconds. Apgars 8/9, weight 2320g. IV pitocin started. Placenta delivered with fundal massage, gentle cord traction and suprapubic countertraction; noted to be intact with 3 vessel cord. Vagina inspected and no lacerations noted. Fundus firm and lochia minimal at the end of the case. QBL 25.    Dr. Doshi was present for entire delivery.    Christina Haley MD  OB/GYN Resident, PGY-2    Physician Attestation  I was present for the entire delivery, including baby and placenta.    Donna Doshi MD  Date of Service (when I saw the patient): 22      GA: 33w1d  GP:   Labor Complications: None   EBL:   mL  Delivery QBL:    Delivery Type: Vaginal, Spontaneous   ROM to Delivery Time: (Delivered) Hours: 10 Minutes: 21  Roland Weight: 2.32 kg (5 lb 1.8 oz)    1 Minute 5 Minute 10 Minute   Apgar Totals: 8   9        MARCIA SANCHEZ CHRISTINA Kennedy, Male-Di [[id number]]    Labor Event Times    Dilation complete date: 22 Complete time: 11:19 AM   Start pushing date/time: 2022 1121      Labor Length    2nd Stage (hrs): 0 (min): 2   3rd Stage (hrs): 0 (min): 4      Labor Events     labor?: Yes   steroids: Partial Course  Labor Type: Spontaneous  Predominate monitoring during 1st stage: continuous electronic fetal monitoring     Antibiotics  received during labor?: Yes     Rupture identifier: Sac 1  Rupture date/time: 22 0100   Rupture type: Spontaneous rupture of membranes occuring during spontaneous labor or augmentation  Fluid color: Clear  Fluid odor: Normal     Augmentation: None     Delivery/Placenta Date and Time    Delivery Date: 22 Delivery Time: 11:21 AM   Placenta Date/Time: 2022 11:25 AM  Delivering clinician: Donna Doshi MD   Other personnel present at delivery:  Provider Role   Yoseph Ortiz RN Davidson, Sarah, MD          Vaginal Counts     Initial count performed by 2 team members:  Two Team Members   Orquidea Haley       Needles Suture Needles Sponges (RETIRED) Instruments   Initial counts 2 0 5    Added to count 0 0 0    Relief counts       Final counts 2 0 5          Placed during labor Accounted for at the end of labor   FSE NA NA   IUPC NA NA   Cervidil NA NA              Final count performed by 2 team members:  Two Team Members   Christina Ortiz      Final count correct?: Yes     Apgars    Living status: Living   1 Minute 5 Minute 10 Minute 15 Minute 20 Minute   Skin color: 0  1       Heart rate: 2  2       Reflex irritability: 2  2       Muscle tone: 2  2       Respiratory effort: 2  2       Total: 8  9       Apgars assigned by: ANNA HINOJOSA CNP     Cord    Vessels: 3 Vessels    Cord Complications: None               Cord Blood Disposition: Lab    Gases Sent?: No    Delayed cord clamping?: Yes    Cord Clamping Delay (seconds):  seconds    Stem cell collection?: No       [place] Resuscitation    Methods: Oximetry, Temp Skin Control   Care at Delivery: NICU team called to attend a vaginal delivery for a  infant at 33w1d GA for PTL and PROM.  Infant presented with a strong, lusty cry, good tone, and was centrally pink.  Infant brought to radiant warmer, drying and stimulation performed.  Infant continued to cry lustily, have good tone, and was  centrally pink with cap refill <3 seconds. Gross physical exam unremarkable. Infant swaddled and held by mother for a few minutes.  Infant then transferred to NICU for the further management of prematurity. Parents updated, father accompanied NICU team.    ANNA Greene, CNP  2022 12:23 PM        Measurements    Weight: 5 lb 1.8 oz       Skin to Skin and Feeding Plan    Skin to skin initiation date/time:     Skin to skin end date/time:     Reason skin to skin not initiated:  Acuity     Labor Events and Shoulder Dystocia    Fetal Tracing Prior to Delivery: Category 1  Shoulder dystocia present?: Neg     Delivery (Maternal) (Provider to Complete) ([id number])    Episiotomy: None  Perineal lacerations: None    Repair suture: None  Genital tract inspection done: Pos     Blood Loss  Mother: Di Kennedy #[id number]   Start of Mother's Information    Delivery Blood Loss  22 2321 - 22    None           End of Mother's Information  Mother: Di Kennedy #[id number]          Delivery - Provider to Complete ([id number])    Delivering clinician: Donna Doshi MD  Attempted Delivery Types (Choose all that apply): Spontaneous Vaginal Delivery  Delivery Type (Choose the 1 that will go to the Birth History): Vaginal, Spontaneous                   Other personnel:  Provider Role   Yoseph Ortiz RN Davidson, Sarah, MD                 Placenta    Date/Time: 2022 11:25 AM  Removal: Spontaneous  Disposition: Pathology           Anesthesia    Method: None                Presentation and Position    Presentation: Vertex     Occiput Anterior                 DONNA DOSHI MD

## 2022-06-18 NOTE — PLAN OF CARE
Pt is stable. Denies pain, but is using hot pack for cramping. Pumping and getting drops, but not consistent with pumping. The lactation from NICU saw pt before leaving for home. Pt is up voiding and ambulating down to visit baby in NICU. Vital signs are stable and FF at U/2 with scant lochia. Assessed pt for pain. Reviewed teaching and discharge instructions with pt. Pt was given discharge medications, a rental breast pump, gift for the baby and a copy of the discharge papers. Pt is discharged home today,

## 2022-06-18 NOTE — PLAN OF CARE
Problem: Plan of Care - These are the overarching goals to be used throughout the patient stay.    Goal: Patient-Specific Goal (Individualized)  Description: Pt to continue pumping at least every 3 hours.   6/18/2022 0150 by Debi Selby RN  Outcome: Ongoing, Progressing  6/18/2022 0144 by Debi Selby RN  Outcome: Ongoing, Progressing     Problem: Plan of Care - These are the overarching goals to be used throughout the patient stay.    Goal: Absence of Hospital-Acquired Illness or Injury  Intervention: Prevent and Manage VTE (Venous Thromboembolism) Risk  Recent Flowsheet Documentation  Taken 6/17/2022 2120 by Debi Selby RN  Activity Management: up ad kinjal   Goal Outcome Evaluation:      Data: VSS, postpartum assessments WNL. She is voiding without difficulty, up ad kinjal, eating and drinking normally. Perineum appears to be healing well, lochia WNL, no s/s infection. Pumping independently for infant in NICU. Taking Tylenol and Ibuprofen for pain as needed and using hot pack for her back. Ibuprofen was refused during shift, pt took tylenol once. Reports good pain management.   Action: Education provided on plan of care.  Response: Pt is agreeable with her plan of care. Positive attachment behaviors observed with infant in NICU. Will continue with plan of care.

## 2022-06-18 NOTE — PROGRESS NOTES
Post Partum Progress Note  PPD#1    Subjective:  She is resting comfortably in bed this morning. Has no complaints from overnight. Pain is improving and well controlled on current medication regimen. She is tolerating PO intake. Lochia present and minimal.  She is voiding without difficulty. She has passed flatus. She is ambulating without dizziness or difficulty.  She denies headache, changes in vision, nausea/vomiting, chest pain, shortness of breath, RUQ pain, or worsening edema.  Plans to breast feed. Ready to discharge home.     Objective:  Vitals:    22 1345 22 1725 22 2120 22 0821   BP: 104/57 110/63 95/61 105/70   BP Location:   Left arm Left arm   Patient Position:   Semi-Ardon's Semi-Ardon's   Cuff Size:   Adult Regular Adult Regular   Pulse: 70 79 78 72   Resp: 16 16 16 16   Temp: 98.5  F (36.9  C) 98.5  F (36.9  C) 98  F (36.7  C) 98.2  F (36.8  C)   TempSrc: Oral Oral Oral Oral   General: NAD, resting comfortably  CV: Regular rate, well perfused.   Pulm: Normal respiratory effort.  Abd: Soft, non-tender, non-distended. Fundus is firm 2cm cm below the umbilicus.    Ext: Trace lower extremity edema bilaterally. No calf tenderness.    Assessment/Plan:  Di Kennedy is a 32 year old  female who is POD#1 s/p precipitous vaginal delivery at 33w1d. Pregnancy otherwise complicated by GDMA1.     Routine Postpartum Care   - Encourage routine post-operative goals including ambulation and incentive spirometry  - PNC: Rh positive. Rubella immune. No intervention indicated.  - Pain: controlled on oral medications  - Heme: Hgb 11.2 > EBL 25cc>AM Hgb pending.  If <10 will discharge home with iron.  - GI: continue anti-emetics and stool softeners as needed.  - : Voiding spontaneously .  - Infant: Stable in NICU, doing well  - Feeding: Plans on breast feeding.  - BC: Plans on ParaGard IUD at 6w.    GDMA1   - Fasting BG pending.     Discharge to home on PPD#1, stable and meeting all  goals.     Tatum Cardoso MD  OB/GYN PGY-3  06/18/22 8:45 AM    Patient in NICU  Reviewed course and vitals with resident and nurse.  Hemoglobin   Date Value Ref Range Status   06/18/2022 10.9 (L) 11.7 - 15.7 g/dL Final   06/17/2022 11.3 (L) 11.7 - 15.7 g/dL Final     Zonia Negron MD

## 2022-06-20 ENCOUNTER — PATIENT OUTREACH (OUTPATIENT)
Dept: CARE COORDINATION | Facility: CLINIC | Age: 32
End: 2022-06-20

## 2022-06-20 ENCOUNTER — TELEPHONE (OUTPATIENT)
Dept: OBGYN | Facility: CLINIC | Age: 32
End: 2022-06-20

## 2022-06-20 DIAGNOSIS — Z71.89 OTHER SPECIFIED COUNSELING: ICD-10-CM

## 2022-06-20 NOTE — PROGRESS NOTES
Clinic Care Coordination Contact    Background: Care Coordination referral placed from [facility] discharge report for reason of patient meeting criteria for a TCM outreach call by [facility] [place] team.    Assessment: Upon chart review, [facility] Team member will cancel/close the referral for TCM outreach due to reason below:    Patient talked to Clinic RN today regarding hospital discharge.    Plan: Care Coordination referral for TCM outreach canceled.    Evelyne Ann MA  [facility]

## 2022-06-20 NOTE — TELEPHONE ENCOUNTER
Di delivered on Friday at 32 + weeks.  She reports that her ankles have been swollen x 2 days.    She has always had blood pressures in normal range.  She did receive IV fluids in labor.    She denies headache, visual disturbances or RUQ pain.  She reports that her edema does decrease overnight.    Encouraged Di to elevate her feet higher than her heart.  Can wear compression stockings if available.  Encouraged increased fluid intake. Discussed signs of pre-eclampsia.    To call back if swelling is worsening, headache, visual disturbances, RUQ pain or elevated BP

## 2022-06-21 ENCOUNTER — TELEPHONE (OUTPATIENT)
Dept: OBGYN | Facility: CLINIC | Age: 32
End: 2022-06-21

## 2022-06-21 NOTE — TELEPHONE ENCOUNTER
Forms faxed and copied today.  6-24-22            Received form off of fax from [facility] for patient, disability/FMLA forms, Placed in midwives basket as patient has upcoming PP appointment.    Ellen Figueroa  Clinical Services Assistant

## 2022-06-24 ENCOUNTER — TELEPHONE (OUTPATIENT)
Dept: OBGYN | Facility: CLINIC | Age: 32
End: 2022-06-24

## 2022-06-24 NOTE — TELEPHONE ENCOUNTER
[facility] paperwork completed and faxed to fax number [phone].     Forms placed in the filing cabinet in nurse triage area.     - Emily Aguilar   Clinic Services Assistant

## 2022-07-01 ENCOUNTER — TELEPHONE (OUTPATIENT)
Dept: OBGYN | Facility: CLINIC | Age: 32
End: 2022-07-01

## 2022-07-01 NOTE — TELEPHONE ENCOUNTER
----- Message from Miriam Ayala RN sent at 2022  8:25 AM CDT -----  Regardin weeks PP - still bleeding  Patient reports she is 2 weeks pp and still having some bright red bleeding with some clots

## 2022-07-01 NOTE — TELEPHONE ENCOUNTER
Writer called patient to discuss further.     Patient reporting concerns about postpartum bleeding. Patient reports wearing panty liners, changing 3 times daily. Patient denies cramping. Patient reports 2 clots yesterday after resting.    Patient encouraged to continue to rest. Bleeding precautions given. Patient verbalized understanding and agreement to plan.

## 2022-07-04 LAB
PATH REPORT.COMMENTS IMP SPEC: NORMAL
PATH REPORT.COMMENTS IMP SPEC: NORMAL
PATH REPORT.FINAL DX SPEC: NORMAL
PATH REPORT.GROSS SPEC: NORMAL
PATH REPORT.MICROSCOPIC SPEC OTHER STN: NORMAL
PATH REPORT.RELEVANT HX SPEC: NORMAL
PHOTO IMAGE: NORMAL

## 2022-07-05 ENCOUNTER — OFFICE VISIT (OUTPATIENT)
Dept: OBGYN | Facility: CLINIC | Age: 32
End: 2022-07-05
Attending: REGISTERED NURSE
Payer: COMMERCIAL

## 2022-07-05 VITALS
DIASTOLIC BLOOD PRESSURE: 62 MMHG | WEIGHT: 163 LBS | HEART RATE: 75 BPM | SYSTOLIC BLOOD PRESSURE: 100 MMHG | BODY MASS INDEX: 27.12 KG/M2

## 2022-07-05 DIAGNOSIS — O24.410 GDM (GESTATIONAL DIABETES MELLITUS), CLASS A1: ICD-10-CM

## 2022-07-05 DIAGNOSIS — N61.0 MASTITIS: ICD-10-CM

## 2022-07-05 PROCEDURE — G0463 HOSPITAL OUTPT CLINIC VISIT: HCPCS

## 2022-07-05 PROCEDURE — 99207 PR POST PARTUM EXAM: CPT | Performed by: REGISTERED NURSE

## 2022-07-05 RX ORDER — DICLOXACILLIN SODIUM 500 MG
500 CAPSULE ORAL 4 TIMES DAILY
Qty: 40 CAPSULE | Refills: 0 | Status: SHIPPED | OUTPATIENT
Start: 2022-07-05 | End: 2022-07-15

## 2022-07-05 ASSESSMENT — ANXIETY QUESTIONNAIRES
3. WORRYING TOO MUCH ABOUT DIFFERENT THINGS: NOT AT ALL
GAD7 TOTAL SCORE: 1
6. BECOMING EASILY ANNOYED OR IRRITABLE: NOT AT ALL
7. FEELING AFRAID AS IF SOMETHING AWFUL MIGHT HAPPEN: NOT AT ALL
5. BEING SO RESTLESS THAT IT IS HARD TO SIT STILL: NOT AT ALL
GAD7 TOTAL SCORE: 1
IF YOU CHECKED OFF ANY PROBLEMS ON THIS QUESTIONNAIRE, HOW DIFFICULT HAVE THESE PROBLEMS MADE IT FOR YOU TO DO YOUR WORK, TAKE CARE OF THINGS AT HOME, OR GET ALONG WITH OTHER PEOPLE: NOT DIFFICULT AT ALL
1. FEELING NERVOUS, ANXIOUS, OR ON EDGE: NOT AT ALL
2. NOT BEING ABLE TO STOP OR CONTROL WORRYING: SEVERAL DAYS

## 2022-07-05 ASSESSMENT — PAIN SCALES - GENERAL: PAINLEVEL: SEVERE PAIN (6)

## 2022-07-05 ASSESSMENT — PATIENT HEALTH QUESTIONNAIRE - PHQ9
SUM OF ALL RESPONSES TO PHQ QUESTIONS 1-9: 1
5. POOR APPETITE OR OVEREATING: NOT AT ALL

## 2022-07-05 NOTE — PROGRESS NOTES
"2-Week Postpartum Visit    Subjective:  Di Kennedy 32 year old year old female, , who presents for 2 week postpartum follow-up.  Pt had a  of viable baby boy at 33w1d. Pregnancy was complicated by PPROM, GDMA1, and velamentous cord insertion.     - Reports baby boy is doing well in the NICU, uncertain when he will be discharged but she is able to see him everyday and he continues to feed well.   - She is aving concerns with clogged milk ducts on the right side since  evening, reports that she has felt \"sick\" because of these had has had a headache and felt achey, fatigued. Took ibuprofen and pumping frequently along with massaging and once unclogged and breasts are empty then she doesn't feel sick anymore. This has been going on for 2 days. Currently, she does not feel sick or febrile and does not report reddened tender area on breast, although still has plugged milk duct this morning.   - She is pumping every 4 hours. Saw LC in the NICU. Was told to \"slow down milk production\" and was told to pump and not to fully empty. She has had mastitis in the past when she \"didn't fully empty\" so she has continued to pump to empty. Reports with previous pregnancy she had a horrible case of mastitis and was very ill, worried this is starting to happen again.     Breast feeding: breastfeeding/pumping   Abdominal pain: no  Bleeding since delivery: yes, did pass large size clot last week, now bleeding has resolved.   Contraception choice: Paragard IUD vs. Hormonal IUD    Last PAP:   Lab Results   Component Value Date    PAP NIL 2020       Pt screened for postpartum depression and complaints are: NEGATIVE   PHQ9= 1  MIL= 1    Objective:   /62   Pulse 75   Wt 73.9 kg (163 lb)   LMP 10/28/2021   BMI 27.12 kg/m      She appears well, afebrile.  Abdomen: benign, soft, nontender, no masses.   Breasts: Lactating.  Nipples intact with no cracking. Clogged milk duct on right breast underneath areola, " mildly tender to touch, no redness noted.   Pelvic Exam: exam deferred    Assessment:   2 week postpartum   Possible Mastitis    Plan:    - Reviewed various IUD options with patient. Handouts given on various types of IUD, plans to review option of ParaGard vs. Hormonal IUD and decide before 6 weeks.   - Offered Lactation referral today, pt declines.   - Pt is requesting antibiotics for mastitis to have today given her history and concern infection may be starting. Rx sent to preferred pharmacy for dicloxicallin. Instructed to call clinic if no improvement in symptoms or if worsening.   - Discussed importance of breast massage for plugged ducts, recommend use of breast handheld massager.   - Reviewed 2hr GTT for GDM at 6 week visit.   - Reviewed warning signs of pelvic pain, excessive bleeding or abdominal pain, fever/chills, or signs of breast infection.     Follow-up at 6 weeks postpartum for routine visit or sooner as needed. At 6 week visit plan IUD insertion + 2hr GTT.     ANNA Moser CNM

## 2022-07-05 NOTE — LETTER
"2022       RE: Di Kennedy  [address] [place] [address]     Dear Colleague,    Thank you for referring your patient, Di Kennedy, to the [facility] [place] at [facility]. Please see a copy of my visit note below.    2-Week Postpartum Visit    Subjective:  Di Kennedy 32 year old year old female, , who presents for 2 week postpartum follow-up.  Pt had a  of viable baby boy at 33w1d. Pregnancy was complicated by PPROM, GDMA1, and velamentous cord insertion.     - Reports baby boy is doing well in the NICU, uncertain when he will be discharged but she is able to see him everyday and he continues to feed well.   - She is aving concerns with clogged milk ducts on the right side since  evening, reports that she has felt \"sick\" because of these had has had a headache and felt achey, fatigued. Took ibuprofen and pumping frequently along with massaging and once unclogged and breasts are empty then she doesn't feel sick anymore. This has been going on for 2 days. Currently, she does not feel sick or febrile and does not report reddened tender area on breast, although still has plugged milk duct this morning.   - She is pumping every 4 hours. Saw LC in the NICU. Was told to \"slow down milk production\" and was told to pump and not to fully empty. She has had mastitis in the past when she \"didn't fully empty\" so she has continued to pump to empty. Reports with previous pregnancy she had a horrible case of mastitis and was very ill, worried this is starting to happen again.     Breast feeding: breastfeeding/pumping   Abdominal pain: no  Bleeding since delivery: yes, did pass large size clot last week, now bleeding has resolved.   Contraception choice: Paragard IUD vs. Hormonal IUD    Last PAP:   Lab Results   Component Value Date    PAP NIL 2020       Pt screened for postpartum depression and complaints are: " NEGATIVE   PHQ9= 1  MIL= 1    Objective:   /62   Pulse 75   Wt 73.9 kg (163 lb)   LMP 10/28/2021   BMI 27.12 kg/m      She appears well, afebrile.  Abdomen: benign, soft, nontender, no masses.   Breasts: Lactating.  Nipples intact with no cracking. Clogged milk duct on right breast underneath areola, mildly tender to touch, no redness noted.   Pelvic Exam: exam deferred    Assessment:   2 week postpartum   Possible Mastitis    Plan:    - Reviewed various IUD options with patient. Handouts given on various types of IUD, plans to review option of ParaGard vs. Hormonal IUD and decide before 6 weeks.   - Offered Lactation referral today, pt declines.   - Pt is requesting antibiotics for mastitis to have today given her history and concern infection may be starting. Rx sent to preferred pharmacy for dicloxicallin. Instructed to call clinic if no improvement in symptoms or if worsening.   - Discussed importance of breast massage for plugged ducts, recommend use of breast handheld massager.   - Reviewed 2hr GTT for GDM at 6 week visit.   - Reviewed warning signs of pelvic pain, excessive bleeding or abdominal pain, fever/chills, or signs of breast infection.     Follow-up at 6 weeks postpartum for routine visit or sooner as needed. At 6 week visit plan IUD insertion + 2hr GTT.     ANNA Moser CNM

## 2022-07-21 ENCOUNTER — TELEPHONE (OUTPATIENT)
Dept: OBGYN | Facility: CLINIC | Age: 32
End: 2022-07-21

## 2022-07-21 NOTE — TELEPHONE ENCOUNTER
[facility] Call Center    Phone Message    May a detailed message be left on voicemail: yes     Reason for Call: Order(s): Other:   Reason for requested: Order for Glucose. Per patient, per her last appt she is to have a 2 hour glucose lab  Date needed: ASAP  Provider name: Jaad Bah      Action Taken: Message routed to:  [facility] ([facility]): JAMEL    Travel Screening: Not Applicable

## 2022-07-25 DIAGNOSIS — O24.410 GDM (GESTATIONAL DIABETES MELLITUS), CLASS A1: Primary | ICD-10-CM

## 2022-07-25 NOTE — TELEPHONE ENCOUNTER
Attempted to reach patient via telephone in regards to the message below. Patient did not answer, voicemail left encouraging patient to call back to discuss further.

## 2022-08-05 ENCOUNTER — OFFICE VISIT (OUTPATIENT)
Dept: OBGYN | Facility: CLINIC | Age: 32
End: 2022-08-05
Attending: REGISTERED NURSE
Payer: COMMERCIAL

## 2022-08-05 VITALS
SYSTOLIC BLOOD PRESSURE: 104 MMHG | HEIGHT: 65 IN | DIASTOLIC BLOOD PRESSURE: 68 MMHG | HEART RATE: 76 BPM | BODY MASS INDEX: 26.61 KG/M2 | WEIGHT: 159.7 LBS

## 2022-08-05 DIAGNOSIS — Z30.430 ENCOUNTER FOR INSERTION OF INTRAUTERINE CONTRACEPTIVE DEVICE: ICD-10-CM

## 2022-08-05 PROBLEM — Z36.89 ENCOUNTER FOR TRIAGE IN PREGNANT PATIENT: Status: RESOLVED | Noted: 2022-06-03 | Resolved: 2022-08-05

## 2022-08-05 PROCEDURE — G0463 HOSPITAL OUTPT CLINIC VISIT: HCPCS

## 2022-08-05 PROCEDURE — 250N000009 HC RX 250: Performed by: REGISTERED NURSE

## 2022-08-05 PROCEDURE — 58300 INSERT INTRAUTERINE DEVICE: CPT | Performed by: REGISTERED NURSE

## 2022-08-05 RX ORDER — COPPER 313.4 MG/1
1 INTRAUTERINE DEVICE INTRAUTERINE ONCE
COMMUNITY
End: 2022-11-03

## 2022-08-05 RX ORDER — COPPER 313.4 MG/1
1 INTRAUTERINE DEVICE INTRAUTERINE ONCE
Status: COMPLETED
Start: 2022-08-05 | End: 2022-08-05

## 2022-08-05 RX ADMIN — COPPER 1 EACH: 313.4 INTRAUTERINE DEVICE INTRAUTERINE at 10:57

## 2022-08-05 ASSESSMENT — ANXIETY QUESTIONNAIRES
1. FEELING NERVOUS, ANXIOUS, OR ON EDGE: NOT AT ALL
GAD7 TOTAL SCORE: 0
2. NOT BEING ABLE TO STOP OR CONTROL WORRYING: NOT AT ALL
6. BECOMING EASILY ANNOYED OR IRRITABLE: NOT AT ALL
3. WORRYING TOO MUCH ABOUT DIFFERENT THINGS: NOT AT ALL
GAD7 TOTAL SCORE: 0
5. BEING SO RESTLESS THAT IT IS HARD TO SIT STILL: NOT AT ALL
7. FEELING AFRAID AS IF SOMETHING AWFUL MIGHT HAPPEN: NOT AT ALL

## 2022-08-05 ASSESSMENT — PATIENT HEALTH QUESTIONNAIRE - PHQ9: 5. POOR APPETITE OR OVEREATING: NOT AT ALL

## 2022-08-05 NOTE — LETTER
2022       RE: Di Kennedy  [address] [place] [address]     Dear Colleague,    Thank you for referring your patient, Di Kennedy, to the [facility] [place] at [facility]. Please see a copy of my visit note below.    Nursing Notes:   Shauna Turner, FELIZ  2022 10:15 AM  Sign at exiting of workspace  SUBJECTIVE:   Di Kennedy is here for her 6-week postpartum checkup.     PHQ-9 score: 0  Hx of Abuse:  No    Delivery Date: 2022.    Delivering provider:  MD Tico.    Type of delivery:  .  Perineum:  in tact.     Delivery complications: None  Infant gender:  boy, weight 5 pounds 1.8 oz.  Feeding Method:  .  Complications reported with feeding:  none, infant thriving .    Bleeding:  None.  Duration:  2weeks.  Menses resumed:  No  Bowel/Urinary problems:  No    Contraception Planned:  IUD Paragard  She  has not had intercourse since delivery..           ================================================================  ROS: 10 point ROS neg other than the symptoms noted above in the HPI.     s/p  @ 33+1 Male. Intact perineum.   Pregnancy complicated by: PPROM w/  delivery, A1GDM-- well-controlled, Velamentous cord insertion.   Baby is thriving! Spent 3 weeks in NICU Is now 9lbs. No longer needing supplementation. Has 2 hernias, one of which they will repair in December, but otherwise very healthy.    Breast: Breastfeeding well, both breasts. No nipple pain. Denies s/sx of mastitis. Had suspected mastitis infection @ 2wk pp visit. Received 10 course of dicloxicillin. Has not had any symptoms since.   Bleeding: stopped after 2 weeks. No return of period. Has not resumed intercourse.   Bowel: having regular BM's. No longer needing stool softener  Bladder: voiding without difficulty.   Bottom: feels well healed. Did not have a laceration.   Mood: feels stable. No s/sx of depression. Has support from  partner and friends.   Contraception: Plans paragard IUD today    Due for 2hr GTT, hgb A1c-- ordered. Up to date on pap/hpv.     EXAM:  LMP 10/28/2021     General: healthy, alert and no distress  Psych: negative for anxiety and depression  PHQ-9 score: 0  MIL-7 score: 0  HARK score: 0  Breasts:  Declines exam. No concerns.   Abdomen: Benign, Soft, flat, non-tender, No masses, organomegaly and Diastasis less than 1-2 FB  Vulva:  Normal genitalia and Bartholin's, Urethra, [place]'s normal  Vagina:  Moist, pink, with white, creamy and odorless discharge  Cervix:  Multiparous, and pink, moist, closed, without lesion or CMT.    Uterus:  fully involuted and non-tender    Recto-vaginal:   anus normal      ASSESSMENT:   Encounter Diagnosis   Name Primary?     Routine postpartum follow-up Yes      Normal postpartum exam after   Pregnancy was complicated by:  PPROM w/  delivery, A1GDM-- well-controlled, Velamentous cord insertion.       PLAN:  No orders of the defined types were placed in this encounter.     No orders of the defined types were placed in this encounter.       Postpartum education reviewed:  Signs and symptoms of postpartum depression/anxiety discussed and resources offered.  Discussed calcium intake, vitamins and supplements including Vitamin D. Continue prenatal vitamin.   Exercise, high fiber, low fat diet, increased fluid intake, kegel exercises, routine breast self-exam encouraged.    ANNA Trotter CNM      IUD Insertion:  CONSULT:    Is a pregnancy test required: No.  Was a consent obtained?  Yes    Subjective: Di Kennedy is a 32 year old  presents for IUD and desires Paragard type IUD.    Patient has been given the opportunity to ask questions about all forms of birth control, including all options appropriate for Di Kennedy. Discussed that no method of birth control, except abstinence is 100% effective against pregnancy or sexually transmitted infection.     Di  "Brent understands she may have the IUD removed at any time. IUD should be removed by a health care provider.    The entire insertion procedure was reviewed with the patient, including care after placement.    Patient's last menstrual period was 10/28/2021. Last sexual activity: no intercourse since delivery of infant. No allergy to betadine or shellfish. Patient declines STD screening  HCG Qual Urine   Date Value Ref Range Status   08/07/2015 NEGATIVE  Final         /68 (BP Location: Right arm, Patient Position: Chair)   Pulse 76   Ht 1.651 m (5' 5\")   Wt 72.4 kg (159 lb 11.2 oz)   LMP 10/28/2021   BMI 26.58 kg/m      Pelvic Exam: see above    PROCEDURE NOTE: -- IUD Insertion    Reason for Insertion: contraception    Premedicated with ibuprofen.  Under sterile technique, cervix was visualized with speculum and prepped with Chlorhexidine solution swab x 3. Tenaculum was placed for stability. The uterus was gently straightened and sounded to 8.0 cm. IUD prepared for placement, and IUD inserted according to 's instructions without difficulty or significant resitance, and deployed at the fundus. The strings were visualized and trimmed to 2.0 cm from the external os. Tenaculum was removed and hemostasis noted. Speculum removed.  Patient tolerated procedure well.    Lot # [id number]  Exp: 1/2028    EBL: minimal    Complications: none    ASSESSMENT:     ICD-10-CM    1. Routine postpartum follow-up  Z39.2         PLAN:    Given 's handouts, including when to have IUD removed, list of danger s/sx, side effects and follow up recommended. Encouraged condom use for prevention of STD. Back up contraception advised for 7 days if progestin method. Advised to call for any fever, for prolonged or severe pain or bleeding, abnormal vaginal discharge, or unable to palpate strings. She was advised to use pain medications (ibuprofen) as needed for mild to moderate pain. Advised to follow-up in clinic in " 4-6 weeks for IUD string check and ultrasound.     ANNA TrotterM

## 2022-08-05 NOTE — NURSING NOTE
SUBJECTIVE:   Di Kennedy is here for her 6-week postpartum checkup.     PHQ-9 score: 0  Hx of Abuse:  No    Delivery Date: 2022.    Delivering provider:  MD Tico.    Type of delivery:  .  Perineum:  in tact.     Delivery complications: None  Infant gender:  boy, weight 5 pounds 1.8 oz.  Feeding Method:  .  Complications reported with feeding:  none, infant thriving .    Bleeding:  None.  Duration:  2weeks.  Menses resumed:  No  Bowel/Urinary problems:  No    Contraception Planned:  IUD Paragard  She  has not had intercourse since delivery..

## 2022-08-05 NOTE — PROGRESS NOTES
Nursing Notes:   JohnnyShelly garzattFELIZ soliman  2022 10:15 AM  Sign at exiting of workspace  SUBJECTIVE:   Di Kennedy is here for her 6-week postpartum checkup.     PHQ-9 score: 0  Hx of Abuse:  No    Delivery Date: 2022.    Delivering provider:  MD Tico.    Type of delivery:  .  Perineum:  in tact.     Delivery complications: None  Infant gender:  boy, weight 5 pounds 1.8 oz.  Feeding Method:  .  Complications reported with feeding:  none, infant thriving .    Bleeding:  None.  Duration:  2weeks.  Menses resumed:  No  Bowel/Urinary problems:  No    Contraception Planned:  IUD Paragard  She  has not had intercourse since delivery..           ================================================================  ROS: 10 point ROS neg other than the symptoms noted above in the HPI.     s/p  @ 33+1 Male. Intact perineum.   Pregnancy complicated by: PPROM w/  delivery, A1GDM-- well-controlled, Velamentous cord insertion.   Baby is thriving! Spent 3 weeks in NICU Is now 9lbs. No longer needing supplementation. Has 2 hernias, one of which they will repair in December, but otherwise very healthy.    Breast: Breastfeeding well, both breasts. No nipple pain. Denies s/sx of mastitis. Had suspected mastitis infection @ 2wk pp visit. Received 10 course of dicloxicillin. Has not had any symptoms since.   Bleeding: stopped after 2 weeks. No return of period. Has not resumed intercourse.   Bowel: having regular BM's. No longer needing stool softener  Bladder: voiding without difficulty.   Bottom: feels well healed. Did not have a laceration.   Mood: feels stable. No s/sx of depression. Has support from partner and friends.   Contraception: Plans paragard IUD today    Due for 2hr GTT, hgb A1c-- ordered. Up to date on pap/hpv.     EXAM:  LMP 10/28/2021     General: healthy, alert and no distress  Psych: negative for anxiety and depression  PHQ-9 score: 0  MIL-7 score: 0  HARK score: 0  Breasts:  Declines  exam. No concerns.   Abdomen: Benign, Soft, flat, non-tender, No masses, organomegaly and Diastasis less than 1-2 FB  Vulva:  Normal genitalia and Bartholin's, Urethra, [place]'s normal  Vagina:  Moist, pink, with white, creamy and odorless discharge  Cervix:  Multiparous, and pink, moist, closed, without lesion or CMT.    Uterus:  fully involuted and non-tender    Recto-vaginal:   anus normal      ASSESSMENT:   Encounter Diagnosis   Name Primary?     Routine postpartum follow-up Yes      Normal postpartum exam after   Pregnancy was complicated by:  PPROM w/  delivery, A1GDM-- well-controlled, Velamentous cord insertion.       PLAN:  No orders of the defined types were placed in this encounter.     No orders of the defined types were placed in this encounter.       Postpartum education reviewed:  Signs and symptoms of postpartum depression/anxiety discussed and resources offered.  Discussed calcium intake, vitamins and supplements including Vitamin D. Continue prenatal vitamin.   Exercise, high fiber, low fat diet, increased fluid intake, kegel exercises, routine breast self-exam encouraged.    ANNA Trotter CNM      IUD Insertion:  CONSULT:    Is a pregnancy test required: No.  Was a consent obtained?  Yes    Subjective: Di Kennedy is a 32 year old  presents for IUD and desires Paragard type IUD.    Patient has been given the opportunity to ask questions about all forms of birth control, including all options appropriate for Di Kennedy. Discussed that no method of birth control, except abstinence is 100% effective against pregnancy or sexually transmitted infection.     Di Kennedy understands she may have the IUD removed at any time. IUD should be removed by a health care provider.    The entire insertion procedure was reviewed with the patient, including care after placement.    Patient's last menstrual period was 10/28/2021. Last sexual activity: no intercourse since delivery  "of infant. No allergy to betadine or shellfish. Patient declines STD screening  HCG Qual Urine   Date Value Ref Range Status   08/07/2015 NEGATIVE  Final         /68 (BP Location: Right arm, Patient Position: Chair)   Pulse 76   Ht 1.651 m (5' 5\")   Wt 72.4 kg (159 lb 11.2 oz)   LMP 10/28/2021   BMI 26.58 kg/m      Pelvic Exam: see above    PROCEDURE NOTE: -- IUD Insertion    Reason for Insertion: contraception    Premedicated with ibuprofen.  Under sterile technique, cervix was visualized with speculum and prepped with Chlorhexidine solution swab x 3. Tenaculum was placed for stability. The uterus was gently straightened and sounded to 8.0 cm. IUD prepared for placement, and IUD inserted according to 's instructions without difficulty or significant resitance, and deployed at the fundus. The strings were visualized and trimmed to 2.0 cm from the external os. Tenaculum was removed and hemostasis noted. Speculum removed.  Patient tolerated procedure well.    Lot # [id number]  Exp: 1/2028    EBL: minimal    Complications: none    ASSESSMENT:     ICD-10-CM    1. Routine postpartum follow-up  Z39.2         PLAN:    Given 's handouts, including when to have IUD removed, list of danger s/sx, side effects and follow up recommended. Encouraged condom use for prevention of STD. Back up contraception advised for 7 days if progestin method. Advised to call for any fever, for prolonged or severe pain or bleeding, abnormal vaginal discharge, or unable to palpate strings. She was advised to use pain medications (ibuprofen) as needed for mild to moderate pain. Advised to follow-up in clinic in 4-6 weeks for IUD string check and ultrasound.     ANNA Trotter CNM    "

## 2022-08-18 ENCOUNTER — MEDICAL CORRESPONDENCE (OUTPATIENT)
Dept: HEALTH INFORMATION MANAGEMENT | Facility: CLINIC | Age: 32
End: 2022-08-18

## 2022-09-02 ENCOUNTER — LAB REQUISITION (OUTPATIENT)
Dept: LAB | Facility: CLINIC | Age: 32
End: 2022-09-02

## 2022-09-02 PROCEDURE — 86481 TB AG RESPONSE T-CELL SUSP: CPT | Performed by: INTERNAL MEDICINE

## 2022-09-06 LAB
GAMMA INTERFERON BACKGROUND BLD IA-ACNC: 0.04 IU/ML
M TB IFN-G BLD-IMP: NEGATIVE
M TB IFN-G CD4+ BCKGRND COR BLD-ACNC: 9.1 IU/ML
MITOGEN IGNF BCKGRD COR BLD-ACNC: 0 IU/ML
MITOGEN IGNF BCKGRD COR BLD-ACNC: 0.01 IU/ML
QUANTIFERON MITOGEN: 9.14 IU/ML
QUANTIFERON NIL TUBE: 0.04 IU/ML
QUANTIFERON TB1 TUBE: 0.05 IU/ML
QUANTIFERON TB2 TUBE: 0.04

## 2022-09-11 ENCOUNTER — HEALTH MAINTENANCE LETTER (OUTPATIENT)
Age: 32
End: 2022-09-11

## 2022-11-03 ENCOUNTER — HOSPITAL ENCOUNTER (OUTPATIENT)
Dept: GENERAL RADIOLOGY | Facility: HOSPITAL | Age: 32
Discharge: HOME | End: 2022-11-03
Attending: NURSE PRACTITIONER

## 2022-11-03 ENCOUNTER — OFFICE VISIT (OUTPATIENT)
Dept: FAMILY MEDICINE | Facility: CLINIC | Age: 32
End: 2022-11-03

## 2022-11-03 VITALS
BODY MASS INDEX: 25.99 KG/M2 | WEIGHT: 156.2 LBS | TEMPERATURE: 98.9 F | DIASTOLIC BLOOD PRESSURE: 78 MMHG | RESPIRATION RATE: 18 BRPM | HEART RATE: 70 BPM | OXYGEN SATURATION: 100 % | SYSTOLIC BLOOD PRESSURE: 121 MMHG
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
## 2022-11-03 DIAGNOSIS — S62.172A CLOSED DISPLACED FRACTURE OF TRAPEZIUM OF LEFT WRIST, INITIAL ENCOUNTER: Primary | ICD-10-CM

## 2022-11-03 DIAGNOSIS — S69.92XA HAND INJURY, LEFT, INITIAL ENCOUNTER: ICD-10-CM

## 2022-11-03 PROCEDURE — 73110 X-RAY EXAM OF WRIST: CPT | Mod: LT

## 2022-11-03 PROCEDURE — 99214 OFFICE O/P EST MOD 30 MIN: CPT | Performed by: NURSE PRACTITIONER

## 2022-11-03 PROCEDURE — 73130 X-RAY EXAM OF HAND: CPT | Mod: LT,FY
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
## 2022-11-08 ENCOUNTER — TRANSFERRED RECORDS (OUTPATIENT)
Dept: HEALTH INFORMATION MANAGEMENT | Facility: CLINIC | Age: 32
End: 2022-11-08

## 2022-12-20 ENCOUNTER — TRANSFERRED RECORDS (OUTPATIENT)
Dept: HEALTH INFORMATION MANAGEMENT | Facility: CLINIC | Age: 32
End: 2022-12-20

## 2023-01-22 ENCOUNTER — HEALTH MAINTENANCE LETTER (OUTPATIENT)
Age: 33
End: 2023-01-22

## 2023-05-26 ENCOUNTER — TRANSFERRED RECORDS (OUTPATIENT)
Dept: HEALTH INFORMATION MANAGEMENT | Facility: CLINIC | Age: 33
End: 2023-05-26

## 2024-02-24 ENCOUNTER — HEALTH MAINTENANCE LETTER (OUTPATIENT)
Age: 34
End: 2024-02-24

## 2024-05-07 ENCOUNTER — ALLIED HEALTH/NURSE VISIT (OUTPATIENT)
Dept: FAMILY MEDICINE | Facility: CLINIC | Age: 34
End: 2024-05-07

## 2024-05-07 DIAGNOSIS — S61.419A LACERATION OF HAND: Primary | ICD-10-CM

## 2024-05-07 PROCEDURE — 99207 PR NO CHARGE NURSE ONLY: CPT

## 2024-05-07 NOTE — PROGRESS NOTES
"Patient walked into clinic today reporting bleeding of right hand/wrist laceration.    Per patient:  Cleaning dishes last night  Ceramic mug broke and caused laceration  Slight numbness/tingling of area just above laceration  Has full ROM of fingers and right hand  Placed gauze over laceration and wrapped with a cotton gauze wrap    Writer removed bandaging.    Laceration: approximately 1.25\" in length where right palm meets wrist.  Not actively bleeding nor oozing upon palpation/movement.    Skin edges well approximated and laceration is superficial.    Right fingers capillary reflex less than 3 seconds.    Wound cleansed with sterile normal saline.  Area dried and covered with folded 4 x 4 cotton gauze, wrapped with 3\" cotton gauze and secured with paper tape.    Wound care measures reviewed with patient along with signs/symptoms of infection.  Patient encouraged to call and speak with a nurse with any questions/concerns.  Triage nurses are available 24/7.      Patient verbalized understanding and in agreement with plan.    MASTER HaqN, RN-[facility]    "

## 2024-05-18 ENCOUNTER — OFFICE VISIT (OUTPATIENT)
Dept: URGENT CARE | Facility: URGENT CARE | Age: 34
End: 2024-05-18

## 2024-05-18 ENCOUNTER — ANCILLARY PROCEDURE (OUTPATIENT)
Dept: GENERAL RADIOLOGY | Facility: CLINIC | Age: 34
End: 2024-05-18
Attending: NURSE PRACTITIONER
Payer: COMMERCIAL

## 2024-05-18 VITALS
SYSTOLIC BLOOD PRESSURE: 120 MMHG | BODY MASS INDEX: 25.96 KG/M2 | HEART RATE: 67 BPM | TEMPERATURE: 97.2 F | RESPIRATION RATE: 19 BRPM | OXYGEN SATURATION: 98 % | WEIGHT: 156 LBS | DIASTOLIC BLOOD PRESSURE: 60 MMHG

## 2024-05-18 DIAGNOSIS — S69.91XA FINGER INJURY, RIGHT, INITIAL ENCOUNTER: Primary | ICD-10-CM

## 2024-05-18 DIAGNOSIS — S60.10XA SUBUNGUAL HEMATOMA OF DIGIT OF HAND, INITIAL ENCOUNTER: ICD-10-CM

## 2024-05-18 DIAGNOSIS — S69.91XA FINGER INJURY, RIGHT, INITIAL ENCOUNTER: ICD-10-CM

## 2024-05-18 DIAGNOSIS — S69.92XA FINGER INJURY, LEFT, INITIAL ENCOUNTER: ICD-10-CM

## 2024-05-18 PROCEDURE — 99213 OFFICE O/P EST LOW 20 MIN: CPT

## 2024-05-18 PROCEDURE — 73140 X-RAY EXAM OF FINGER(S): CPT | Mod: TC | Performed by: RADIOLOGY

## 2024-05-18 ASSESSMENT — PAIN SCALES - GENERAL: PAINLEVEL: EXTREME PAIN (8)

## 2024-05-19 NOTE — PROGRESS NOTES
Chief Complaint   Patient presents with    Urgent Care     Smashed finger in car door      SUBJECTIVE:  Di Kennedy is a 34 year old female who presents to the clinic today with a right index finger injury.  She smashed the distal phalanx in a car door several hours ago.  Has increased redness swelling purple bruising pain burning.  Took ibuprofen.    Past Medical History:   Diagnosis Date    Known health problems: none      No current outpatient medications on file.     No current facility-administered medications for this visit.     Social History     Tobacco Use    Smoking status: Never    Smokeless tobacco: Never   Substance Use Topics    Alcohol use: No     No Known Allergies    Review of Systems  All systems negative except for those listed above in HPI.    EXAM:   /60 (BP Location: Left arm, Patient Position: Sitting, Cuff Size: Adult Regular)   Pulse 67   Temp 97.2  F (36.2  C) (Temporal)   Resp 19   Wt 70.8 kg (156 lb)   LMP  (LMP Unknown)   SpO2 98%   BMI 25.96 kg/m      Physical Exam  Vitals reviewed.   Constitutional:       Appearance: Normal appearance.   HENT:      Head: Normocephalic and atraumatic.   Cardiovascular:      Rate and Rhythm: Normal rate.      Pulses: Normal pulses.   Pulmonary:      Effort: Pulmonary effort is normal.   Musculoskeletal:         General: Swelling, tenderness and signs of injury present.   Skin:     General: Skin is warm and dry.      Findings: Bruising and erythema present. No rash.   Neurological:      General: No focal deficit present.      Mental Status: She is alert and oriented to person, place, and time.      Sensory: No sensory deficit.   Psychiatric:         Mood and Affect: Mood normal.         Behavior: Behavior normal.       X-ray done in clinic read by me as negative for finger fracture.    ASSESSMENT:    ICD-10-CM    1. Finger injury, right, initial encounter  S69.91XA XR Finger Right G/E 2 Views      2. Finger injury, left, initial encounter   S69.92XA CANCELED: XR Finger Left G/E 2 Views      3. Subungual hematoma of digit of hand, initial encounter  S60.10XA         PLAN:    Rest ice compression with finger splint elevate  Rotate Tylenol ibuprofen  Discussed subungual hematoma drainage as a potential option if worsening nailbed bruising with throb  This does increase the chance of infection however  Reevaluation if concerns    Follow up with primary care provider with any problems, questions or concerns or if symptoms worsen or fail to improve. Patient agreed to plan and verbalized understanding.    Joyce Singer, LAKESHIA-[facility]

## 2024-05-19 NOTE — PATIENT INSTRUCTIONS
Rest ice compression with finger splint elevate  Rotate Tylenol ibuprofen  Discussed subungual hematoma drainage as a potential option if worsening nailbed bruising with throb  This does increase the chance of infection however  Reevaluation if concerns

## 2025-01-30 ENCOUNTER — OFFICE VISIT (OUTPATIENT)
Dept: FAMILY MEDICINE | Facility: CLINIC | Age: 35
End: 2025-01-30
Payer: COMMERCIAL

## 2025-01-30 ENCOUNTER — ANCILLARY PROCEDURE (OUTPATIENT)
Dept: GENERAL RADIOLOGY | Facility: CLINIC | Age: 35
End: 2025-01-30
Attending: STUDENT IN AN ORGANIZED HEALTH CARE EDUCATION/TRAINING PROGRAM
Payer: COMMERCIAL

## 2025-01-30 VITALS
OXYGEN SATURATION: 100 % | RESPIRATION RATE: 14 BRPM | WEIGHT: 151.7 LBS | SYSTOLIC BLOOD PRESSURE: 127 MMHG | HEART RATE: 69 BPM | HEIGHT: 66 IN | DIASTOLIC BLOOD PRESSURE: 81 MMHG | BODY MASS INDEX: 24.38 KG/M2 | TEMPERATURE: 98.4 F

## 2025-01-30 DIAGNOSIS — G89.29 CHRONIC NECK PAIN: ICD-10-CM

## 2025-01-30 DIAGNOSIS — H81.10 BENIGN PAROXYSMAL POSITIONAL VERTIGO, UNSPECIFIED LATERALITY: ICD-10-CM

## 2025-01-30 DIAGNOSIS — M54.2 CHRONIC NECK PAIN: ICD-10-CM

## 2025-01-30 DIAGNOSIS — M54.50 LUMBAR BACK PAIN: ICD-10-CM

## 2025-01-30 DIAGNOSIS — R42 DIZZINESS: Primary | ICD-10-CM

## 2025-01-30 PROBLEM — M43.12 SPONDYLOLISTHESIS OF CERVICAL REGION: Status: ACTIVE | Noted: 2025-01-30

## 2025-01-30 PROBLEM — M43.10 RETROLISTHESIS: Status: ACTIVE | Noted: 2025-01-30

## 2025-01-30 LAB
ALBUMIN SERPL BCG-MCNC: 4.8 G/DL (ref 3.5–5.2)
ALP SERPL-CCNC: 124 U/L (ref 40–150)
ALT SERPL W P-5'-P-CCNC: 19 U/L (ref 0–50)
ANION GAP SERPL CALCULATED.3IONS-SCNC: 12 MMOL/L (ref 7–15)
AST SERPL W P-5'-P-CCNC: 24 U/L (ref 0–45)
BILIRUB SERPL-MCNC: 0.7 MG/DL
BUN SERPL-MCNC: 12.5 MG/DL (ref 6–20)
CALCIUM SERPL-MCNC: 10.3 MG/DL (ref 8.8–10.4)
CHLORIDE SERPL-SCNC: 101 MMOL/L (ref 98–107)
CREAT SERPL-MCNC: 0.87 MG/DL (ref 0.51–0.95)
EGFRCR SERPLBLD CKD-EPI 2021: 90 ML/MIN/1.73M2
ERYTHROCYTE [DISTWIDTH] IN BLOOD BY AUTOMATED COUNT: 12.1 % (ref 10–15)
EST. AVERAGE GLUCOSE BLD GHB EST-MCNC: 100 MG/DL
FERRITIN SERPL-MCNC: 67 NG/ML (ref 6–175)
GLUCOSE SERPL-MCNC: 98 MG/DL (ref 70–99)
HBA1C MFR BLD: 5.1 % (ref 0–5.6)
HCO3 SERPL-SCNC: 24 MMOL/L (ref 22–29)
HCT VFR BLD AUTO: 41.7 % (ref 35–47)
HGB BLD-MCNC: 13.7 G/DL (ref 11.7–15.7)
IRON BINDING CAPACITY (ROCHE): 432 UG/DL (ref 240–430)
IRON SATN MFR SERPL: 36 % (ref 15–46)
IRON SERPL-MCNC: 154 UG/DL (ref 37–145)
MCH RBC QN AUTO: 30.4 PG (ref 26.5–33)
MCHC RBC AUTO-ENTMCNC: 32.9 G/DL (ref 31.5–36.5)
MCV RBC AUTO: 93 FL (ref 78–100)
PLATELET # BLD AUTO: 365 10E3/UL (ref 150–450)
POTASSIUM SERPL-SCNC: 4 MMOL/L (ref 3.4–5.3)
PROT SERPL-MCNC: 8.1 G/DL (ref 6.4–8.3)
RBC # BLD AUTO: 4.5 10E6/UL (ref 3.8–5.2)
SODIUM SERPL-SCNC: 137 MMOL/L (ref 135–145)
TSH SERPL DL<=0.005 MIU/L-ACNC: 1.34 UIU/ML (ref 0.3–4.2)
WBC # BLD AUTO: 8.6 10E3/UL (ref 4–11)

## 2025-01-30 RX ORDER — CYCLOBENZAPRINE HCL 5 MG
5 TABLET ORAL 3 TIMES DAILY PRN
Qty: 20 TABLET | Refills: 0 | Status: SHIPPED | OUTPATIENT
Start: 2025-01-30

## 2025-01-30 ASSESSMENT — ENCOUNTER SYMPTOMS: BACK PAIN: 1

## 2025-01-30 ASSESSMENT — PAIN SCALES - GENERAL: PAINLEVEL_OUTOF10: MILD PAIN (3)
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
## 2025-01-30 NOTE — PATIENT INSTRUCTIONS
Dizziness   OTC: meclizine or dramamine  Referral to PT for dizzy/balance    Lumbar spine  XR today  OTC options: aleve, tylenol  Topicals: biofreeze, lidocaine patches, voltaren gel  XR of neck and low back   Flexeril 5mg as needed for muscle relief      Use a foam roller to help with muscle tightness and pain.  Handheld foam roller  Good for hamstrings, quadriceps, calves  Large foam roller  Good for back, glutes, IT band                Dr. Jimenez

## 2025-01-31 ENCOUNTER — THERAPY VISIT (OUTPATIENT)
Dept: PHYSICAL THERAPY | Facility: CLINIC | Age: 35
End: 2025-01-31
Attending: STUDENT IN AN ORGANIZED HEALTH CARE EDUCATION/TRAINING PROGRAM
Payer: COMMERCIAL

## 2025-01-31 DIAGNOSIS — M54.50 LUMBAR BACK PAIN: ICD-10-CM

## 2025-01-31 DIAGNOSIS — M54.2 CHRONIC NECK PAIN: ICD-10-CM

## 2025-01-31 DIAGNOSIS — G89.29 CHRONIC NECK PAIN: ICD-10-CM

## 2025-01-31 PROCEDURE — 97110 THERAPEUTIC EXERCISES: CPT | Mod: GP

## 2025-01-31 PROCEDURE — 97161 PT EVAL LOW COMPLEX 20 MIN: CPT | Mod: GP
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
## 2025-03-09 ENCOUNTER — HEALTH MAINTENANCE LETTER (OUTPATIENT)
Age: 35
End: 2025-03-09

## 2025-05-14 ENCOUNTER — OFFICE VISIT (OUTPATIENT)
Dept: URGENT CARE | Facility: URGENT CARE | Age: 35
End: 2025-05-14
Payer: COMMERCIAL

## 2025-05-14 VITALS
HEART RATE: 93 BPM | TEMPERATURE: 97.7 F | WEIGHT: 153 LBS | OXYGEN SATURATION: 98 % | SYSTOLIC BLOOD PRESSURE: 114 MMHG | BODY MASS INDEX: 25.06 KG/M2 | RESPIRATION RATE: 18 BRPM | DIASTOLIC BLOOD PRESSURE: 73 MMHG

## 2025-05-14 DIAGNOSIS — J02.0 STREP THROAT: ICD-10-CM

## 2025-05-14 DIAGNOSIS — J02.9 ACUTE PHARYNGITIS, UNSPECIFIED ETIOLOGY: Primary | ICD-10-CM

## 2025-05-14 LAB — DEPRECATED S PYO AG THROAT QL EIA: POSITIVE

## 2025-05-14 PROCEDURE — 99213 OFFICE O/P EST LOW 20 MIN: CPT | Performed by: NURSE PRACTITIONER

## 2025-05-14 PROCEDURE — 3074F SYST BP LT 130 MM HG: CPT | Performed by: NURSE PRACTITIONER

## 2025-05-14 PROCEDURE — 87880 STREP A ASSAY W/OPTIC: CPT | Performed by: NURSE PRACTITIONER

## 2025-05-14 PROCEDURE — 3078F DIAST BP <80 MM HG: CPT | Performed by: NURSE PRACTITIONER

## 2025-05-14 RX ORDER — AMOXICILLIN 500 MG/1
500 CAPSULE ORAL 2 TIMES DAILY
Qty: 20 CAPSULE | Refills: 0 | Status: SHIPPED | OUTPATIENT
Start: 2025-05-14 | End: 2025-05-24

## 2025-05-14 NOTE — PROGRESS NOTES
Urgent Care Clinic Visit    Chief Complaint   Patient presents with    Urgent Care     - Started Mon  - Body Aches  - Night Sweats  - Throat Pain  - OTC Cough and Cold Meds for Symptoms               5/14/2025     2:53 PM   Additional Questions   Roomed by Jose PARRISH   Accompanied by None     No  Does the patient have a sore throat and either history of fever >100.4 in the previous 24 hours without a cough or recent exposure to a known case of strep throat? Yes

## 2025-05-14 NOTE — PROGRESS NOTES
Chief Complaint   Patient presents with    Urgent Care     - Started Mon  - Body Aches  - Night Sweats  - Throat Pain  - OTC Cough and Cold Meds for Symptoms     SUBJECTIVE:  Di Kennedy is a 35 year old female presenting with sore throat red swollen tonsils white patches lymph nodes headache nausea fever sweats for 3 days. Children sick with a viral cold, negative testing for them.    Past Medical History:   Diagnosis Date    Known health problems: none      Current Outpatient Medications   Medication Sig Dispense Refill    amoxicillin (AMOXIL) 500 MG capsule Take 1 capsule (500 mg) by mouth 2 times daily for 10 days. 20 capsule 0    cyclobenzaprine (FLEXERIL) 5 MG tablet Take 1 tablet (5 mg) by mouth 3 times daily as needed for muscle spasms. 20 tablet 0     No current facility-administered medications for this visit.     Social History     Tobacco Use    Smoking status: Never    Smokeless tobacco: Never   Substance Use Topics    Alcohol use: No     No Known Allergies    Review of Systems   ROS: 10 point ROS neg other than the symptoms noted above in the HPI.    OBJECTIVE:   /73   Pulse 93   Temp 97.7  F (36.5  C) (Temporal)   Resp 18   Wt 69.4 kg (153 lb)   SpO2 98%   BMI 25.06 kg/m      Physical Exam  Vitals reviewed.   Constitutional:       General: She is not in acute distress.     Appearance: Normal appearance. She is well-developed. She is not ill-appearing, toxic-appearing or diaphoretic.   HENT:      Head: Normocephalic and atraumatic.      Right Ear: Tympanic membrane and ear canal normal.      Left Ear: Tympanic membrane and ear canal normal.      Nose: Nose normal.      Mouth/Throat:      Pharynx: Oropharyngeal exudate and posterior oropharyngeal erythema present.   Eyes:      Conjunctiva/sclera: Conjunctivae normal.      Pupils: Pupils are equal, round, and reactive to light.   Cardiovascular:      Rate and Rhythm: Normal rate.      Pulses: Normal pulses.   Pulmonary:      Effort:  "Pulmonary effort is normal. No respiratory distress.      Breath sounds: No stridor. No wheezing, rhonchi or rales.   Musculoskeletal:         General: Normal range of motion.      Cervical back: Normal range of motion and neck supple.   Lymphadenopathy:      Cervical: Cervical adenopathy present.   Skin:     General: Skin is warm.      Capillary Refill: Capillary refill takes less than 2 seconds.      Findings: No rash.   Neurological:      General: No focal deficit present.      Mental Status: She is alert and oriented to person, place, and time.   Psychiatric:         Mood and Affect: Mood normal.         Behavior: Behavior normal.       Results for orders placed or performed in visit on 05/14/25   Streptococcus A Rapid Scr w Reflx to PCR     Status: Abnormal    Specimen: Throat; Swab   Result Value Ref Range    Group A Strep antigen Positive (A) Negative     ASSESSMENT:    ICD-10-CM    1. Acute pharyngitis, unspecified etiology  J02.9 Streptococcus A Rapid Scr w Reflx to PCR      2. Strep throat  J02.0 amoxicillin (AMOXIL) 500 MG capsule        PLAN:     Antibiotics as directed.  Drink plenty of fluids and rest.  May use salt water gargles- about 8 oz warm water with about 1 teaspoon salt  Sucrets and Cepacol spray are over the counter medications that numb the throat.  Over the counter pain relievers such as tylenol or ibuprofen may be used as needed.   Honey lemon tea helps to soothe the throat. \"Throat Coat\" tea is soothing as well.  Change toothbrush after 24 hours of antibiotics (may soak in 3-6% hydrogen peroxide)  Will be contagious for 24 hours after starting antibiotic  May return to school//work/activities 24 hours after antibiotics are started.  Wash hands frequently and do not share beverages.  Please follow up with primary care provider if symptoms are not improving, worsening or new symptoms or for any adverse reactions to medications.     Follow up with primary care provider with any " problems, questions or concerns or if symptoms worsen or fail to improve. Patient agreed to plan and verbalized understanding.    Joyce Singer, LAKESHIA-BC  [facility]

## 2025-05-14 NOTE — PATIENT INSTRUCTIONS
"Antibiotics as directed for strep.  Drink plenty of fluids and rest.  May use salt water gargles- about 8 oz warm water with about 1 teaspoon salt  Sucrets and Cepacol spray are over the counter medications that numb the throat.  Over the counter pain relievers such as tylenol or ibuprofen may be used as needed.   Honey lemon tea helps to soothe the throat. \"Throat Coat\" tea is soothing as well.  Change toothbrush after 24 hours of antibiotics (may soak in 3-6% hydrogen peroxide)  Will be contagious for 24 hours after starting antibiotic  May return to school//work/activities 24 hours after antibiotics are started.  Wash hands frequently and do not share beverages.  Please follow up with primary care provider if symptoms are not improving, worsening or new symptoms or for any adverse reactions to medications.   "

## 2025-05-14 NOTE — LETTER
May 14, 2025      Di Kennedy  [address] [place] [address]        To Whom It May Concern:    Di Kennedy was seen in our clinic today with strep throat. Please excuse medical related absences for her as well as spouse Bora Pineda. May return Friday 05/16/25 if fever free and improved.      Sincerely,      Joyce Singer      Electronically signed

## 2025-05-29 ENCOUNTER — OFFICE VISIT (OUTPATIENT)
Dept: FAMILY MEDICINE | Facility: CLINIC | Age: 35
End: 2025-05-29
Payer: COMMERCIAL

## 2025-05-29 VITALS
RESPIRATION RATE: 18 BRPM | HEART RATE: 68 BPM | HEIGHT: 65 IN | DIASTOLIC BLOOD PRESSURE: 75 MMHG | BODY MASS INDEX: 25.66 KG/M2 | WEIGHT: 154 LBS | OXYGEN SATURATION: 99 % | TEMPERATURE: 98 F | SYSTOLIC BLOOD PRESSURE: 111 MMHG

## 2025-05-29 DIAGNOSIS — B27.90 INFECTIOUS MONONUCLEOSIS WITHOUT COMPLICATION, INFECTIOUS MONONUCLEOSIS DUE TO UNSPECIFIED ORGANISM: Primary | ICD-10-CM

## 2025-05-29 PROCEDURE — 36415 COLL VENOUS BLD VENIPUNCTURE: CPT | Performed by: STUDENT IN AN ORGANIZED HEALTH CARE EDUCATION/TRAINING PROGRAM

## 2025-05-29 PROCEDURE — 3078F DIAST BP <80 MM HG: CPT | Performed by: STUDENT IN AN ORGANIZED HEALTH CARE EDUCATION/TRAINING PROGRAM

## 2025-05-29 PROCEDURE — 86663 EPSTEIN-BARR ANTIBODY: CPT | Performed by: STUDENT IN AN ORGANIZED HEALTH CARE EDUCATION/TRAINING PROGRAM

## 2025-05-29 PROCEDURE — 3074F SYST BP LT 130 MM HG: CPT | Performed by: STUDENT IN AN ORGANIZED HEALTH CARE EDUCATION/TRAINING PROGRAM

## 2025-05-29 PROCEDURE — 99213 OFFICE O/P EST LOW 20 MIN: CPT | Performed by: STUDENT IN AN ORGANIZED HEALTH CARE EDUCATION/TRAINING PROGRAM

## 2025-05-29 PROCEDURE — 86664 EPSTEIN-BARR NUCLEAR ANTIGEN: CPT | Performed by: STUDENT IN AN ORGANIZED HEALTH CARE EDUCATION/TRAINING PROGRAM

## 2025-05-29 PROCEDURE — 87799 DETECT AGENT NOS DNA QUANT: CPT | Performed by: STUDENT IN AN ORGANIZED HEALTH CARE EDUCATION/TRAINING PROGRAM

## 2025-05-29 PROCEDURE — 86665 EPSTEIN-BARR CAPSID VCA: CPT | Performed by: STUDENT IN AN ORGANIZED HEALTH CARE EDUCATION/TRAINING PROGRAM

## 2025-05-29 RX ORDER — LIDOCAINE HYDROCHLORIDE 20 MG/ML
15 SOLUTION OROPHARYNGEAL
Qty: 100 ML | Refills: 2 | Status: SHIPPED | OUTPATIENT
Start: 2025-05-29

## 2025-05-30 LAB
EBV DNA SERPL NAA+PROBE-ACNC: NOT DETECTED IU/ML
EBV EA-D IGG SER-ACNC: <5 U/ML (ref 0–9)
EBV EA-D IGG SER-ACNC: NORMAL
EBV NA IGG SER IA-ACNC: 324 U/ML
EBV NA IGG SER IA-ACNC: POSITIVE [IU]/ML
EBV VCA IGG SER IA-ACNC: 225 U/ML
EBV VCA IGG SER IA-ACNC: POSITIVE
EBV VCA IGM SER IA-ACNC: <10 U/ML
EBV VCA IGM SER IA-ACNC: NORMAL

## 2025-06-02 ENCOUNTER — OFFICE VISIT (OUTPATIENT)
Dept: URGENT CARE | Facility: URGENT CARE | Age: 35
End: 2025-06-02
Payer: COMMERCIAL

## 2025-06-02 VITALS
SYSTOLIC BLOOD PRESSURE: 122 MMHG | RESPIRATION RATE: 18 BRPM | DIASTOLIC BLOOD PRESSURE: 79 MMHG | HEART RATE: 72 BPM | OXYGEN SATURATION: 99 % | WEIGHT: 152 LBS | TEMPERATURE: 97.9 F | BODY MASS INDEX: 25.33 KG/M2 | HEIGHT: 65 IN

## 2025-06-02 DIAGNOSIS — J03.90 TONSILLITIS: ICD-10-CM

## 2025-06-02 DIAGNOSIS — B27.90 INFECTIOUS MONONUCLEOSIS WITHOUT COMPLICATION, INFECTIOUS MONONUCLEOSIS DUE TO UNSPECIFIED ORGANISM: Primary | ICD-10-CM

## 2025-06-02 DIAGNOSIS — Z98.890: ICD-10-CM

## 2025-06-02 LAB — DEPRECATED S PYO AG THROAT QL EIA: POSITIVE

## 2025-06-02 PROCEDURE — 3074F SYST BP LT 130 MM HG: CPT | Performed by: PHYSICIAN ASSISTANT

## 2025-06-02 PROCEDURE — 3078F DIAST BP <80 MM HG: CPT | Performed by: PHYSICIAN ASSISTANT

## 2025-06-02 PROCEDURE — 87880 STREP A ASSAY W/OPTIC: CPT | Performed by: PHYSICIAN ASSISTANT

## 2025-06-02 PROCEDURE — 96372 THER/PROPH/DIAG INJ SC/IM: CPT | Performed by: PHYSICIAN ASSISTANT

## 2025-06-02 PROCEDURE — 99214 OFFICE O/P EST MOD 30 MIN: CPT | Mod: 25 | Performed by: PHYSICIAN ASSISTANT

## 2025-06-02 NOTE — PROGRESS NOTES
Urgent Care Clinic Visit    Chief Complaint   Patient presents with    Urgent Care     Was here 3 weeks ago, she was treated with antibiotic, steroid and now she has the same symptoms, her test shows positive for mono  Thursday woke up with lock jaw, swelling tonsil and its very painful, has a abscess on the right side of her jaw.               6/2/2025     5:48 PM   Additional Questions   Roomed by Etelvina Polanco   Accompanied by self

## 2025-06-02 NOTE — LETTER
June 2, 2025      Di Kennedy  [address] [place] [address]        To Whom It May Concern:    Di Kennedy  was seen on 6/2/2025.  Please excuse her from work today. Her  was late to work to care for her at her appt due to illness.        Sincerely,        Low Mckeon PA-C    Electronically signed

## 2025-06-02 NOTE — PROGRESS NOTES
Infectious mononucleosis without complication, infectious mononucleosis due to unspecified organism  - WBC with Diff  - Streptococcus A Rapid Screen w/Reflex to PCR - Clinic Collect    Tonsillitis  - WBC with Diff  - Streptococcus A Rapid Screen w/Reflex to PCR - Clinic Collect  - penicillin G benzathine (BICILLIN L-A) injection 1.2 Million Units    Hx of peritonsillar abscess drainage  - WBC with Diff  - Streptococcus A Rapid Screen w/Reflex to PCR - Clinic Collect  - penicillin G benzathine (BICILLIN L-A) injection 1.2 Million Units    30 minutes spent by me on the date of the encounter doing chart review, history and exam, documentation and further activities per the note    My main concern at this time is that the patient had a peritonsillar abscess caused by strep throat that was not fully treated with oral antibiotics alone and that there is a residual strep infection after the abscess has ruptured.  I like to treat the patient with a one-time dose of Bicillin and monitor for improvement.      General Tips for All Ages:    Rest and Hydration:  Allow yourself the time to rest and prioritize hydration.  Stay well-hydrated with water, clear broths, and soothing herbal teas.    Symptomatic Relief:  Over-the-counter (OTC) medications can help alleviate symptoms.  Consider non-pharmacological options like a warm saltwater gargle for soothing a sore throat.    For Infants and Children (Under 2 Years):    Nasal Saline Drops:  Use saline drops to clear nasal congestion in infants.  Administer 1-2 drops in each nostril before feeding or bedtime.    Humidifier:  Place a cool-mist humidifier in the room to ease congestion.  Remember to clean the humidifier regularly.  Okay to use [employer]     Acetaminophen (if applicable):  Use acetaminophen for fever and discomfort.  Follow dosing guidelines based on your child's weight.  Avoid aspirin in children to prevent Reye's syndrome.    Contact Pediatrician:  If symptoms  persist or worsen, or if your child shows signs of respiratory distress, contact your pediatrician.    For Children (2-12 Years):    Nasal Saline Solution:  Encourage the use of saline nasal spray for congestion relief.  Teach your child to blow their nose gently.    Honey (for those over 1 year):  Use honey to soothe a cough (1-2 teaspoons as needed).  Do not give honey to children under 1 year due to the risk of botulism.    Acetaminophen or Ibuprofen:  Use acetaminophen or ibuprofen for fever and pain relief.  Follow dosing guidelines based on your child's weight.    Fluid Intake:  Ensure your child drinks warm liquids like herbal teas and broths.  Monitor their fluid intake to keep them hydrated.    For Adolescents and Adults (12 Years and Older):    Decongestants (Pseudoephedrine/Phenylephrine):  Consider OTC decongestants for nasal congestion.  Use with caution if you have hypertension, and avoid prolonged use.    Cough Suppressants (Dextromethorphan):  Choose a cough suppressant for persistent cough.  Avoid in children under 12 years; use honey instead.  Follow package instructions and avoid multiple medications with similar ingredients.    Pain Relievers (Acetaminophen or Ibuprofen):  Use acetaminophen or ibuprofen for pain and fever.  Follow package instructions.  Take ibuprofen with food to minimize stomach upset.    Rest and Isolation:  Prioritize rest and stay at home to prevent spreading the infection.    For All Ages:    Hydration:  Ensure you stay well-hydrated; monitor urine color to gauge hydration.    Hand Hygiene:  Wash hands with soap and water for at least 20 seconds.  Use hand  with at least 60% alcohol if soap is unavailable.    Avoid Tobacco Smoke:  Stay away from tobacco smoke, which can worsen respiratory symptoms.    Seek Medical Attention:  If symptoms worsen or if you experience difficulty breathing, seek medical attention promptly.  Look out for red flag symptoms like  persistent high fever, severe headache, or chest pain.    Patient advised to return to clinic for reevaluation (either UC or PCP) if symptoms do not improve in 7 days. Patient educated on red flag symptoms and asked to go directly to the ED if these symptoms present themselves.     Remember, this guide is meant to assist you, but individual cases may vary. If you have concerns or if symptoms persist, don't hesitate to reach out to a healthcare professional. Wishing you a speedy recovery!    Low Mckeon PA-C  [facility]    Subjective   35 year old who presents to clinic today for the following health issues:    Urgent Care       HPI     Acute Illness  Acute illness concerns:   Patient visits the clinic today after having throat pain for 3 weeks.  She tested positive for strep on 5/14 and was treated with a 10-day course of amoxicillin.  She then tested positive for mononucleosis on 5/29 after having a course of prednisone.  These did not seem to improve the patient's symptoms.  Patient states that on Saturday she tasted a foul taste in her mouth and realized that she had a ruptured abscess on her right side near her tonsil.  She states that this actually improved her pain since then.  Patient woke up on Thursday morning with a stiff jaw but she states that she has an ongoing history of jaw issues.  She states that she has been having some swollen lymph nodes along her neck.  Patient denies any recent fevers, fatigue, chills, or bodyaches.    Review of Systems   Review of Systems   See HPI    Objective    Temp: 97.9  F (36.6  C) Temp src: Temporal BP: 122/79 Pulse: 72   Resp: 18 SpO2: 99 %       Physical Exam   Physical Exam  Constitutional:       General: She is not in acute distress.     Appearance: Normal appearance. She is normal weight. She is not ill-appearing, toxic-appearing or diaphoretic.   HENT:      Head: Normocephalic and atraumatic.      Right Ear: Tympanic membrane, ear canal and  external ear normal. There is no impacted cerumen.      Left Ear: Tympanic membrane, ear canal and external ear normal. There is no impacted cerumen.      Nose: Nose normal. No congestion or rhinorrhea.      Mouth/Throat:      Mouth: Mucous membranes are moist.      Pharynx: Posterior oropharyngeal erythema present. No oropharyngeal exudate.      Comments: Patient does not seem to have any evidence of current peritonsillar abscess.  Her tonsils are erythematous and there is no white exudates at this time.  Cardiovascular:      Rate and Rhythm: Normal rate and regular rhythm.      Pulses: Normal pulses.      Heart sounds: Normal heart sounds. No murmur heard.     No friction rub. No gallop.   Pulmonary:      Effort: Pulmonary effort is normal. No respiratory distress.      Breath sounds: No stridor. No wheezing, rhonchi or rales.   Chest:      Chest wall: No tenderness.   Lymphadenopathy:      Cervical: Cervical adenopathy present.   Neurological:      General: No focal deficit present.      Mental Status: She is alert and oriented to person, place, and time. Mental status is at baseline.      Gait: Gait normal.   Psychiatric:         Mood and Affect: Mood normal.         Behavior: Behavior normal.         Thought Content: Thought content normal.         Judgment: Judgment normal.          Results for orders placed or performed in visit on 06/02/25 (from the past 24 hours)   Streptococcus A Rapid Screen w/Reflex to PCR - Clinic Collect    Specimen: Throat; Swab   Result Value Ref Range    Group A Strep antigen Positive (A) Negative

## 2025-06-03 ENCOUNTER — RESULTS FOLLOW-UP (OUTPATIENT)
Dept: ORTHOPEDICS | Facility: CLINIC | Age: 35
End: 2025-06-03

## 2025-06-03 DIAGNOSIS — B00.1 COLD SORE: Primary | ICD-10-CM

## 2025-06-03 NOTE — PROGRESS NOTES
"  {PROVIDER CHARTING PREFERENCE:084421}    Subjective   Di is a 35 year old, presenting for the following health issues:  Pharyngitis (Tonsil got worse or swollen )  {(!) Visit Details have not yet been documented.  Please enter Visit Details and then use this list to pull in documentation. (Optional):331593}  HPI    Patient was seen on 5/14 for a sore throat was diagnosed with strep pharyngitis.  She was given antibiotics which she completed.  She has been continued symptoms   With bilateral tonsil hypertrophy.  No significant exudate or erythema.  {MA/LPN/RN Pre-Provider Visit Orders- hCG/UA/Strep (Optional):924645}  {SUPERLIST (Optional):910992}  {additonal problems for provider to add (Optional):801396}    {ROS Picklists (Optional):248827}      Objective    /75   Pulse 68   Temp 98  F (36.7  C) (Oral)   Resp 18   Ht 1.652 m (5' 5.04\")   Wt 69.9 kg (154 lb)   LMP 05/26/2025   SpO2 99%   Breastfeeding Yes   BMI 25.60 kg/m    Body mass index is 25.6 kg/m .  Physical Exam   {Exam List (Optional):289454}    {Diagnostic Test Results (Optional):522464}        Signed Electronically by: Richard Flaherty MD  {Email feedback regarding this note to [email or web address]   :[id number]}  " oropharyngeal erythema.   Eyes:      Extraocular Movements: Extraocular movements intact.      Conjunctiva/sclera: Conjunctivae normal.   Cardiovascular:      Rate and Rhythm: Normal rate and regular rhythm.   Pulmonary:      Effort: Pulmonary effort is normal.      Breath sounds: Normal breath sounds.   Abdominal:      General: Abdomen is flat.      Palpations: Abdomen is soft.   Musculoskeletal:         General: Normal range of motion.   Skin:     General: Skin is warm.      Capillary Refill: Capillary refill takes less than 2 seconds.   Neurological:      General: No focal deficit present.      Mental Status: She is alert.                    Signed Electronically by: Richard Flaherty MD

## 2025-06-04 RX ORDER — VALACYCLOVIR HYDROCHLORIDE 1 G/1
2000 TABLET, FILM COATED ORAL 2 TIMES DAILY
Qty: 4 TABLET | Refills: 0 | OUTPATIENT
Start: 2025-06-04 | End: 2025-06-05